# Patient Record
Sex: MALE | NOT HISPANIC OR LATINO | URBAN - METROPOLITAN AREA
[De-identification: names, ages, dates, MRNs, and addresses within clinical notes are randomized per-mention and may not be internally consistent; named-entity substitution may affect disease eponyms.]

---

## 2023-06-23 LAB
ANION GAP IN SER/PLAS: 10 MMOL/L (ref 10–20)
CALCIUM (MG/DL) IN SER/PLAS: 8.3 MG/DL (ref 8.6–10.3)
CARBON DIOXIDE, TOTAL (MMOL/L) IN SER/PLAS: 30 MMOL/L (ref 21–32)
CHLORIDE (MMOL/L) IN SER/PLAS: 104 MMOL/L (ref 98–107)
CHOLESTEROL (MG/DL) IN SER/PLAS: 101 MG/DL (ref 0–199)
CHOLESTEROL IN HDL (MG/DL) IN SER/PLAS: 36.9 MG/DL
CHOLESTEROL/HDL RATIO: 2.7
CREATININE (MG/DL) IN SER/PLAS: 1.14 MG/DL (ref 0.5–1.3)
GFR MALE: 68 ML/MIN/1.73M2
GLUCOSE (MG/DL) IN SER/PLAS: 76 MG/DL (ref 74–99)
LDL: 54 MG/DL (ref 0–99)
POTASSIUM (MMOL/L) IN SER/PLAS: 5 MMOL/L (ref 3.5–5.3)
SODIUM (MMOL/L) IN SER/PLAS: 139 MMOL/L (ref 136–145)
THYROTROPIN (MIU/L) IN SER/PLAS BY DETECTION LIMIT <= 0.05 MIU/L: 1.18 MIU/L (ref 0.44–3.98)
TRIGLYCERIDE (MG/DL) IN SER/PLAS: 53 MG/DL (ref 0–149)
UREA NITROGEN (MG/DL) IN SER/PLAS: 17 MG/DL (ref 6–23)
VLDL: 11 MG/DL (ref 0–40)

## 2023-06-26 LAB
ESTIMATED AVERAGE GLUCOSE FOR HBA1C: 243 MG/DL
HEMOGLOBIN A1C/HEMOGLOBIN TOTAL IN BLOOD: 10.1 %

## 2023-08-31 PROBLEM — E11.9 TYPE 2 DIABETES MELLITUS (MULTI): Status: ACTIVE | Noted: 2023-08-31

## 2023-08-31 PROBLEM — Z79.4 LONG TERM (CURRENT) USE OF INSULIN (MULTI): Status: ACTIVE | Noted: 2023-08-31

## 2023-08-31 RX ORDER — INSULIN GLARGINE 100 [IU]/ML
10 INJECTION, SOLUTION SUBCUTANEOUS NIGHTLY
COMMUNITY
Start: 2022-12-19 | End: 2023-10-09 | Stop reason: DRUGHIGH

## 2023-08-31 RX ORDER — ACETAMINOPHEN 325 MG/1
2 TABLET ORAL EVERY 4 HOURS PRN
COMMUNITY
Start: 2022-12-19

## 2023-08-31 RX ORDER — BECAPLERMIN 100 UG/G
GEL TOPICAL
COMMUNITY
Start: 2009-04-17

## 2023-08-31 RX ORDER — METFORMIN HYDROCHLORIDE 1000 MG/1
1 TABLET ORAL DAILY
COMMUNITY

## 2023-08-31 RX ORDER — PANTOPRAZOLE SODIUM 40 MG/1
1 TABLET, DELAYED RELEASE ORAL DAILY
COMMUNITY
Start: 2022-12-19

## 2023-10-04 ENCOUNTER — APPOINTMENT (OUTPATIENT)
Dept: WOUND CARE | Facility: CLINIC | Age: 73
End: 2023-10-04
Payer: MEDICARE

## 2023-10-04 ENCOUNTER — OFFICE VISIT (OUTPATIENT)
Dept: WOUND CARE | Facility: CLINIC | Age: 73
End: 2023-10-04
Payer: MEDICARE

## 2023-10-04 PROCEDURE — 3046F HEMOGLOBIN A1C LEVEL >9.0%: CPT | Performed by: CLINICAL NURSE SPECIALIST

## 2023-10-04 PROCEDURE — 11043 DBRDMT MUSC&/FSCA 1ST 20/<: CPT | Performed by: CLINICAL NURSE SPECIALIST

## 2023-10-04 PROCEDURE — 3066F NEPHROPATHY DOC TX: CPT | Performed by: CLINICAL NURSE SPECIALIST

## 2023-10-04 PROCEDURE — 1159F MED LIST DOCD IN RCRD: CPT | Performed by: CLINICAL NURSE SPECIALIST

## 2023-10-04 PROCEDURE — 11042 DBRDMT SUBQ TIS 1ST 20SQCM/<: CPT | Performed by: CLINICAL NURSE SPECIALIST

## 2023-10-04 PROCEDURE — 11042 DBRDMT SUBQ TIS 1ST 20SQCM/<: CPT

## 2023-10-04 PROCEDURE — 11043 DBRDMT MUSC&/FSCA 1ST 20/<: CPT
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
## 2023-10-08 NOTE — PATIENT INSTRUCTIONS
Thank you for choosing [facility]  for your health care needs.  If you have any questions, concerns or medical needs, please feel free to contact our office at [phone].    Please ensure you complete your blood work one week before the next scheduled appointment.    To continue Metformin 1000mg twice daily; please be consistent with this   To discontinue Lantus 25 units subcutaneous bedtime  To commence Humalog 75/25 20 units subcutaneous twice daily   Counseled that the goal A1C should be 7% or less  Counseled glycemic control is warranted to prevent microvascular complications  For follow up in 3 months

## 2023-10-08 NOTE — ASSESSMENT & PLAN NOTE
To continue Metformin 1000mg twice daily; please be consistent with this   Counseled that he has no renal contraindications to the use of Metformin   To discontinue Lantus 25 units subcutaneous bedtime  To commence Humalog 75/25 20 units subcutaneous twice daily   Counseled that the goal A1C should be 7% or less  Counseled glycemic control is warranted to prevent microvascular complications units subcutaneous bedtime

## 2023-10-09 ENCOUNTER — OFFICE VISIT (OUTPATIENT)
Dept: ENDOCRINOLOGY | Facility: CLINIC | Age: 73
End: 2023-10-09
Payer: MEDICARE

## 2023-10-09 VITALS
HEART RATE: 88 BPM | DIASTOLIC BLOOD PRESSURE: 60 MMHG | SYSTOLIC BLOOD PRESSURE: 118 MMHG | HEIGHT: 72 IN | BODY MASS INDEX: 20.59 KG/M2 | WEIGHT: 152 LBS

## 2023-10-09 DIAGNOSIS — E11.40 TYPE 2 DIABETES MELLITUS WITH DIABETIC NEUROPATHY, WITH LONG-TERM CURRENT USE OF INSULIN (MULTI): Primary | ICD-10-CM

## 2023-10-09 DIAGNOSIS — Z79.4 TYPE 2 DIABETES MELLITUS WITH DIABETIC NEUROPATHY, WITH LONG-TERM CURRENT USE OF INSULIN (MULTI): Primary | ICD-10-CM

## 2023-10-09 PROBLEM — Z72.0 TOBACCO ABUSE: Status: ACTIVE | Noted: 2023-10-09

## 2023-10-09 LAB
POC FINGERSTICK BLOOD GLUCOSE: 173 MG/DL (ref 70–100)
POC HEMOGLOBIN A1C: 14 % (ref 4.2–6.5)

## 2023-10-09 PROCEDURE — 1159F MED LIST DOCD IN RCRD: CPT | Performed by: INTERNAL MEDICINE

## 2023-10-09 PROCEDURE — 3066F NEPHROPATHY DOC TX: CPT | Performed by: INTERNAL MEDICINE

## 2023-10-09 PROCEDURE — 3046F HEMOGLOBIN A1C LEVEL >9.0%: CPT | Performed by: INTERNAL MEDICINE

## 2023-10-09 PROCEDURE — 99214 OFFICE O/P EST MOD 30 MIN: CPT | Performed by: INTERNAL MEDICINE

## 2023-10-09 PROCEDURE — 3074F SYST BP LT 130 MM HG: CPT | Performed by: INTERNAL MEDICINE

## 2023-10-09 PROCEDURE — 83036 HEMOGLOBIN GLYCOSYLATED A1C: CPT | Performed by: INTERNAL MEDICINE

## 2023-10-09 PROCEDURE — 3078F DIAST BP <80 MM HG: CPT | Performed by: INTERNAL MEDICINE

## 2023-10-09 PROCEDURE — 82962 GLUCOSE BLOOD TEST: CPT | Performed by: INTERNAL MEDICINE

## 2023-10-09 RX ORDER — INSULIN LISPRO 100 [IU]/ML
20 INJECTION, SUSPENSION SUBCUTANEOUS
Qty: 36 ML | Refills: 3 | Status: SHIPPED | OUTPATIENT
Start: 2023-10-09 | End: 2023-10-19 | Stop reason: CLARIF

## 2023-10-09 ASSESSMENT — ENCOUNTER SYMPTOMS
COUGH: 1
SHORTNESS OF BREATH: 0

## 2023-10-11 ENCOUNTER — OFFICE VISIT (OUTPATIENT)
Dept: WOUND CARE | Facility: CLINIC | Age: 73
End: 2023-10-11
Payer: MEDICARE

## 2023-10-11 ENCOUNTER — APPOINTMENT (OUTPATIENT)
Dept: WOUND CARE | Facility: CLINIC | Age: 73
End: 2023-10-11

## 2023-10-11 PROCEDURE — 11042 DBRDMT SUBQ TIS 1ST 20SQCM/<: CPT

## 2023-10-18 ENCOUNTER — OFFICE VISIT (OUTPATIENT)
Dept: WOUND CARE | Facility: CLINIC | Age: 73
End: 2023-10-18
Payer: MEDICARE

## 2023-10-18 ENCOUNTER — APPOINTMENT (OUTPATIENT)
Dept: WOUND CARE | Facility: CLINIC | Age: 73
End: 2023-10-18

## 2023-10-18 PROCEDURE — 11043 DBRDMT MUSC&/FSCA 1ST 20/<: CPT

## 2023-10-19 DIAGNOSIS — E11.9 TYPE 2 DIABETES MELLITUS WITHOUT COMPLICATION, WITH LONG-TERM CURRENT USE OF INSULIN (MULTI): Primary | ICD-10-CM

## 2023-10-19 DIAGNOSIS — Z79.4 TYPE 2 DIABETES MELLITUS WITHOUT COMPLICATION, WITH LONG-TERM CURRENT USE OF INSULIN (MULTI): Primary | ICD-10-CM

## 2023-10-19 RX ORDER — INSULIN ASPART 100 [IU]/ML
20 INJECTION, SUSPENSION SUBCUTANEOUS
Qty: 15 ML | Refills: 4 | Status: SHIPPED | OUTPATIENT
Start: 2023-10-19 | End: 2024-04-24

## 2023-10-25 ENCOUNTER — APPOINTMENT (OUTPATIENT)
Dept: WOUND CARE | Facility: CLINIC | Age: 73
End: 2023-10-25
Payer: MEDICARE

## 2023-11-01 ENCOUNTER — OFFICE VISIT (OUTPATIENT)
Dept: WOUND CARE | Facility: CLINIC | Age: 73
End: 2023-11-01
Payer: MEDICARE

## 2023-11-01 PROCEDURE — 11043 DBRDMT MUSC&/FSCA 1ST 20/<: CPT

## 2023-11-15 ENCOUNTER — LAB REQUISITION (OUTPATIENT)
Dept: LAB | Facility: HOSPITAL | Age: 73
End: 2023-11-15
Payer: MEDICARE

## 2023-11-15 ENCOUNTER — OFFICE VISIT (OUTPATIENT)
Dept: WOUND CARE | Facility: CLINIC | Age: 73
End: 2023-11-15
Payer: MEDICARE

## 2023-11-15 DIAGNOSIS — L97.823 NON-PRESSURE CHRONIC ULCER OF OTHER PART OF LEFT LOWER LEG WITH NECROSIS OF MUSCLE (MULTI): ICD-10-CM

## 2023-11-15 PROCEDURE — 87186 SC STD MICRODIL/AGAR DIL: CPT | Mod: OUT,PORLAB | Performed by: PODIATRIST

## 2023-11-15 PROCEDURE — 11043 DBRDMT MUSC&/FSCA 1ST 20/<: CPT

## 2023-11-19 LAB
BACTERIA SPEC CULT: ABNORMAL
BACTERIA SPEC CULT: ABNORMAL
GRAM STN SPEC: ABNORMAL
GRAM STN SPEC: ABNORMAL

## 2023-11-22 ENCOUNTER — OFFICE VISIT (OUTPATIENT)
Dept: WOUND CARE | Facility: CLINIC | Age: 73
End: 2023-11-22
Payer: MEDICARE

## 2023-11-22 PROCEDURE — 11042 DBRDMT SUBQ TIS 1ST 20SQCM/<: CPT | Performed by: CLINICAL NURSE SPECIALIST

## 2023-11-22 PROCEDURE — 11042 DBRDMT SUBQ TIS 1ST 20SQCM/<: CPT

## 2023-11-22 PROCEDURE — 99213 OFFICE O/P EST LOW 20 MIN: CPT | Performed by: CLINICAL NURSE SPECIALIST

## 2023-11-29 ENCOUNTER — OFFICE VISIT (OUTPATIENT)
Dept: WOUND CARE | Facility: CLINIC | Age: 73
End: 2023-11-29
Payer: MEDICARE

## 2023-11-29 PROCEDURE — 11042 DBRDMT SUBQ TIS 1ST 20SQCM/<: CPT

## 2023-12-06 ENCOUNTER — OFFICE VISIT (OUTPATIENT)
Dept: WOUND CARE | Facility: CLINIC | Age: 73
End: 2023-12-06
Payer: MEDICARE

## 2023-12-06 PROCEDURE — 11043 DBRDMT MUSC&/FSCA 1ST 20/<: CPT

## 2023-12-13 ENCOUNTER — OFFICE VISIT (OUTPATIENT)
Dept: WOUND CARE | Facility: CLINIC | Age: 73
End: 2023-12-13
Payer: MEDICARE

## 2023-12-13 PROCEDURE — 11043 DBRDMT MUSC&/FSCA 1ST 20/<: CPT

## 2023-12-20 ENCOUNTER — LAB REQUISITION (OUTPATIENT)
Dept: LAB | Facility: HOSPITAL | Age: 73
End: 2023-12-20
Payer: MEDICARE

## 2023-12-20 ENCOUNTER — OFFICE VISIT (OUTPATIENT)
Dept: WOUND CARE | Facility: CLINIC | Age: 73
End: 2023-12-20
Payer: MEDICARE

## 2023-12-20 DIAGNOSIS — L97.812 NON-PRESSURE CHRONIC ULCER OF OTHER PART OF RIGHT LOWER LEG WITH FAT LAYER EXPOSED (MULTI): ICD-10-CM

## 2023-12-20 PROCEDURE — 87205 SMEAR GRAM STAIN: CPT | Mod: OUT,PORLAB | Performed by: PODIATRIST

## 2023-12-20 PROCEDURE — 87070 CULTURE OTHR SPECIMN AEROBIC: CPT | Mod: OUT,PORLAB | Performed by: PODIATRIST

## 2023-12-20 PROCEDURE — 11043 DBRDMT MUSC&/FSCA 1ST 20/<: CPT

## 2023-12-23 LAB
B-LACTAMASE ORGANISM ISLT: NEGATIVE
BACTERIA SPEC CULT: NORMAL
BACTERIA SPEC CULT: NORMAL
GRAM STN SPEC: NORMAL
GRAM STN SPEC: NORMAL

## 2023-12-27 ENCOUNTER — APPOINTMENT (OUTPATIENT)
Dept: WOUND CARE | Facility: CLINIC | Age: 73
End: 2023-12-27
Payer: MEDICARE

## 2024-01-03 ENCOUNTER — OFFICE VISIT (OUTPATIENT)
Dept: WOUND CARE | Facility: CLINIC | Age: 74
End: 2024-01-03
Payer: MEDICARE

## 2024-01-03 PROCEDURE — 11043 DBRDMT MUSC&/FSCA 1ST 20/<: CPT

## 2024-01-10 ENCOUNTER — OFFICE VISIT (OUTPATIENT)
Dept: WOUND CARE | Facility: CLINIC | Age: 74
End: 2024-01-10
Payer: MEDICARE

## 2024-01-10 ENCOUNTER — LAB REQUISITION (OUTPATIENT)
Dept: LAB | Facility: HOSPITAL | Age: 74
End: 2024-01-10
Payer: MEDICARE

## 2024-01-10 DIAGNOSIS — L97.324: ICD-10-CM

## 2024-01-10 DIAGNOSIS — E11.622 TYPE 2 DIABETES MELLITUS WITH OTHER SKIN ULCER (CODE) (MULTI): ICD-10-CM

## 2024-01-10 PROCEDURE — 87070 CULTURE OTHR SPECIMN AEROBIC: CPT | Mod: OUT,PORLAB | Performed by: PODIATRIST

## 2024-01-10 PROCEDURE — 11043 DBRDMT MUSC&/FSCA 1ST 20/<: CPT

## 2024-01-13 LAB
BACTERIA SPEC CULT: ABNORMAL
GRAM STN SPEC: ABNORMAL
GRAM STN SPEC: ABNORMAL

## 2024-01-17 ENCOUNTER — OFFICE VISIT (OUTPATIENT)
Dept: WOUND CARE | Facility: CLINIC | Age: 74
End: 2024-01-17
Payer: MEDICARE

## 2024-01-17 PROCEDURE — 11042 DBRDMT SUBQ TIS 1ST 20SQCM/<: CPT

## 2024-01-24 ENCOUNTER — OFFICE VISIT (OUTPATIENT)
Dept: WOUND CARE | Facility: CLINIC | Age: 74
End: 2024-01-24
Payer: MEDICARE

## 2024-01-24 PROCEDURE — 99213 OFFICE O/P EST LOW 20 MIN: CPT | Performed by: CLINICAL NURSE SPECIALIST

## 2024-01-24 PROCEDURE — 11043 DBRDMT MUSC&/FSCA 1ST 20/<: CPT | Performed by: CLINICAL NURSE SPECIALIST

## 2024-01-24 PROCEDURE — 11043 DBRDMT MUSC&/FSCA 1ST 20/<: CPT

## 2024-01-31 ENCOUNTER — OFFICE VISIT (OUTPATIENT)
Dept: WOUND CARE | Facility: CLINIC | Age: 74
End: 2024-01-31
Payer: MEDICARE

## 2024-01-31 PROCEDURE — 11043 DBRDMT MUSC&/FSCA 1ST 20/<: CPT

## 2024-01-31 PROCEDURE — 11042 DBRDMT SUBQ TIS 1ST 20SQCM/<: CPT

## 2024-02-07 ENCOUNTER — OFFICE VISIT (OUTPATIENT)
Dept: WOUND CARE | Facility: CLINIC | Age: 74
End: 2024-02-07
Payer: MEDICARE

## 2024-02-07 PROCEDURE — 11043 DBRDMT MUSC&/FSCA 1ST 20/<: CPT

## 2024-02-14 ENCOUNTER — OFFICE VISIT (OUTPATIENT)
Dept: WOUND CARE | Facility: CLINIC | Age: 74
End: 2024-02-14
Payer: MEDICARE

## 2024-02-14 PROCEDURE — 11043 DBRDMT MUSC&/FSCA 1ST 20/<: CPT

## 2024-02-21 ENCOUNTER — OFFICE VISIT (OUTPATIENT)
Dept: WOUND CARE | Facility: CLINIC | Age: 74
End: 2024-02-21
Payer: MEDICARE

## 2024-02-21 PROCEDURE — 11042 DBRDMT SUBQ TIS 1ST 20SQCM/<: CPT

## 2024-02-28 ENCOUNTER — OFFICE VISIT (OUTPATIENT)
Dept: WOUND CARE | Facility: CLINIC | Age: 74
End: 2024-02-28
Payer: MEDICARE

## 2024-02-28 PROCEDURE — 11042 DBRDMT SUBQ TIS 1ST 20SQCM/<: CPT

## 2024-03-06 ENCOUNTER — OFFICE VISIT (OUTPATIENT)
Dept: WOUND CARE | Facility: CLINIC | Age: 74
End: 2024-03-06
Payer: MEDICARE

## 2024-03-06 PROCEDURE — 11043 DBRDMT MUSC&/FSCA 1ST 20/<: CPT

## 2024-03-13 ENCOUNTER — OFFICE VISIT (OUTPATIENT)
Dept: WOUND CARE | Facility: CLINIC | Age: 74
End: 2024-03-13
Payer: MEDICARE

## 2024-03-13 PROCEDURE — 11043 DBRDMT MUSC&/FSCA 1ST 20/<: CPT | Mod: ZK

## 2024-03-13 PROCEDURE — 11043 DBRDMT MUSC&/FSCA 1ST 20/<: CPT | Performed by: CLINICAL NURSE SPECIALIST

## 2024-03-20 ENCOUNTER — OFFICE VISIT (OUTPATIENT)
Dept: WOUND CARE | Facility: CLINIC | Age: 74
End: 2024-03-20
Payer: MEDICARE

## 2024-03-20 PROCEDURE — 11042 DBRDMT SUBQ TIS 1ST 20SQCM/<: CPT

## 2024-03-21 DIAGNOSIS — M86.9 OSTEOMYELITIS, UNSPECIFIED (MULTI): Primary | ICD-10-CM

## 2024-03-27 ENCOUNTER — OFFICE VISIT (OUTPATIENT)
Dept: WOUND CARE | Facility: CLINIC | Age: 74
End: 2024-03-27
Payer: MEDICARE

## 2024-03-27 PROCEDURE — 11042 DBRDMT SUBQ TIS 1ST 20SQCM/<: CPT

## 2024-04-03 ENCOUNTER — OFFICE VISIT (OUTPATIENT)
Dept: WOUND CARE | Facility: CLINIC | Age: 74
End: 2024-04-03
Payer: MEDICARE

## 2024-04-03 ENCOUNTER — LAB REQUISITION (OUTPATIENT)
Dept: LAB | Facility: HOSPITAL | Age: 74
End: 2024-04-03
Payer: MEDICARE

## 2024-04-03 DIAGNOSIS — L97.812 NON-PRESSURE CHRONIC ULCER OF OTHER PART OF RIGHT LOWER LEG WITH FAT LAYER EXPOSED (MULTI): ICD-10-CM

## 2024-04-03 PROCEDURE — 11043 DBRDMT MUSC&/FSCA 1ST 20/<: CPT

## 2024-04-03 PROCEDURE — 87070 CULTURE OTHR SPECIMN AEROBIC: CPT | Mod: OUT,PORLAB | Performed by: PODIATRIST

## 2024-04-04 ENCOUNTER — APPOINTMENT (OUTPATIENT)
Dept: WOUND CARE | Facility: CLINIC | Age: 74
End: 2024-04-04
Payer: MEDICARE

## 2024-04-10 ENCOUNTER — OFFICE VISIT (OUTPATIENT)
Dept: WOUND CARE | Facility: CLINIC | Age: 74
End: 2024-04-10
Payer: MEDICARE

## 2024-04-10 PROCEDURE — 11042 DBRDMT SUBQ TIS 1ST 20SQCM/<: CPT

## 2024-04-17 ENCOUNTER — OFFICE VISIT (OUTPATIENT)
Dept: WOUND CARE | Facility: CLINIC | Age: 74
End: 2024-04-17
Payer: MEDICARE

## 2024-04-17 PROCEDURE — 11042 DBRDMT SUBQ TIS 1ST 20SQCM/<: CPT

## 2024-04-24 ENCOUNTER — OFFICE VISIT (OUTPATIENT)
Dept: WOUND CARE | Facility: CLINIC | Age: 74
End: 2024-04-24
Payer: MEDICARE

## 2024-04-24 PROCEDURE — 11043 DBRDMT MUSC&/FSCA 1ST 20/<: CPT

## 2024-05-01 ENCOUNTER — LAB (OUTPATIENT)
Dept: LAB | Facility: LAB | Age: 74
End: 2024-05-01
Payer: MEDICARE

## 2024-05-01 ENCOUNTER — OFFICE VISIT (OUTPATIENT)
Dept: WOUND CARE | Facility: CLINIC | Age: 74
End: 2024-05-01
Payer: MEDICARE

## 2024-05-01 DIAGNOSIS — E11.621 TYPE 2 DIABETES MELLITUS WITH FOOT ULCER (CODE) (MULTI): Primary | ICD-10-CM

## 2024-05-01 PROCEDURE — 83036 HEMOGLOBIN GLYCOSYLATED A1C: CPT

## 2024-05-01 PROCEDURE — 11043 DBRDMT MUSC&/FSCA 1ST 20/<: CPT

## 2024-05-01 PROCEDURE — 36415 COLL VENOUS BLD VENIPUNCTURE: CPT

## 2024-05-02 LAB
EST. AVERAGE GLUCOSE BLD GHB EST-MCNC: 237 MG/DL
HBA1C MFR BLD: 9.9 %

## 2024-05-08 ENCOUNTER — OFFICE VISIT (OUTPATIENT)
Dept: WOUND CARE | Facility: CLINIC | Age: 74
End: 2024-05-08
Payer: MEDICARE

## 2024-05-08 PROCEDURE — 11042 DBRDMT SUBQ TIS 1ST 20SQCM/<: CPT

## 2024-05-15 ENCOUNTER — OFFICE VISIT (OUTPATIENT)
Dept: WOUND CARE | Facility: CLINIC | Age: 74
End: 2024-05-15
Payer: MEDICARE

## 2024-05-15 PROCEDURE — 11042 DBRDMT SUBQ TIS 1ST 20SQCM/<: CPT

## 2024-05-22 ENCOUNTER — OFFICE VISIT (OUTPATIENT)
Dept: WOUND CARE | Facility: CLINIC | Age: 74
End: 2024-05-22
Payer: MEDICARE

## 2024-05-22 PROCEDURE — 15275 SKIN SUB GRAFT FACE/NK/HF/G: CPT

## 2024-05-29 ENCOUNTER — OFFICE VISIT (OUTPATIENT)
Dept: WOUND CARE | Facility: CLINIC | Age: 74
End: 2024-05-29
Payer: MEDICARE

## 2024-05-29 PROCEDURE — 15271 SKIN SUB GRAFT TRNK/ARM/LEG: CPT

## 2024-06-05 ENCOUNTER — OFFICE VISIT (OUTPATIENT)
Dept: WOUND CARE | Facility: CLINIC | Age: 74
End: 2024-06-05
Payer: MEDICARE

## 2024-06-05 PROCEDURE — 15271 SKIN SUB GRAFT TRNK/ARM/LEG: CPT

## 2024-06-12 ENCOUNTER — OFFICE VISIT (OUTPATIENT)
Dept: WOUND CARE | Facility: CLINIC | Age: 74
End: 2024-06-12
Payer: MEDICARE

## 2024-06-12 PROCEDURE — 15271 SKIN SUB GRAFT TRNK/ARM/LEG: CPT

## 2024-06-19 ENCOUNTER — OFFICE VISIT (OUTPATIENT)
Dept: WOUND CARE | Facility: CLINIC | Age: 74
End: 2024-06-19
Payer: MEDICARE

## 2024-06-19 PROCEDURE — 15271 SKIN SUB GRAFT TRNK/ARM/LEG: CPT

## 2024-06-26 ENCOUNTER — OFFICE VISIT (OUTPATIENT)
Dept: WOUND CARE | Facility: CLINIC | Age: 74
End: 2024-06-26
Payer: MEDICARE

## 2024-06-26 PROCEDURE — 11042 DBRDMT SUBQ TIS 1ST 20SQCM/<: CPT | Mod: 59

## 2024-06-26 PROCEDURE — 15271 SKIN SUB GRAFT TRNK/ARM/LEG: CPT

## 2024-07-03 ENCOUNTER — OFFICE VISIT (OUTPATIENT)
Dept: WOUND CARE | Facility: CLINIC | Age: 74
End: 2024-07-03
Payer: MEDICARE

## 2024-07-03 PROCEDURE — 11043 DBRDMT MUSC&/FSCA 1ST 20/<: CPT

## 2024-07-10 ENCOUNTER — APPOINTMENT (OUTPATIENT)
Dept: WOUND CARE | Facility: CLINIC | Age: 74
End: 2024-07-10
Payer: MEDICARE

## 2024-07-11 ENCOUNTER — CLINICAL SUPPORT (OUTPATIENT)
Dept: WOUND CARE | Facility: CLINIC | Age: 74
End: 2024-07-11
Payer: MEDICARE

## 2024-07-11 PROCEDURE — 99214 OFFICE O/P EST MOD 30 MIN: CPT

## 2024-07-17 ENCOUNTER — OFFICE VISIT (OUTPATIENT)
Dept: WOUND CARE | Facility: CLINIC | Age: 74
End: 2024-07-17
Payer: MEDICARE

## 2024-07-17 PROCEDURE — 15271 SKIN SUB GRAFT TRNK/ARM/LEG: CPT

## 2024-07-22 DIAGNOSIS — Z79.4 TYPE 2 DIABETES MELLITUS WITH DIABETIC NEUROPATHY, WITH LONG-TERM CURRENT USE OF INSULIN (MULTI): Primary | ICD-10-CM

## 2024-07-22 DIAGNOSIS — E11.40 TYPE 2 DIABETES MELLITUS WITH DIABETIC NEUROPATHY, WITH LONG-TERM CURRENT USE OF INSULIN (MULTI): Primary | ICD-10-CM

## 2024-07-23 RX ORDER — INSULIN GLARGINE 100 [IU]/ML
INJECTION, SOLUTION SUBCUTANEOUS
Qty: 10 EACH | Refills: 5 | Status: SHIPPED | OUTPATIENT
Start: 2024-07-23

## 2024-07-24 ENCOUNTER — OFFICE VISIT (OUTPATIENT)
Dept: WOUND CARE | Facility: CLINIC | Age: 74
End: 2024-07-24
Payer: MEDICARE

## 2024-07-24 PROCEDURE — 15271 SKIN SUB GRAFT TRNK/ARM/LEG: CPT

## 2024-07-31 ENCOUNTER — OFFICE VISIT (OUTPATIENT)
Dept: WOUND CARE | Facility: CLINIC | Age: 74
End: 2024-07-31
Payer: MEDICARE

## 2024-07-31 PROCEDURE — 11043 DBRDMT MUSC&/FSCA 1ST 20/<: CPT

## 2024-08-07 ENCOUNTER — OFFICE VISIT (OUTPATIENT)
Dept: WOUND CARE | Facility: CLINIC | Age: 74
End: 2024-08-07
Payer: MEDICARE

## 2024-08-07 PROCEDURE — 11042 DBRDMT SUBQ TIS 1ST 20SQCM/<: CPT

## 2024-08-14 ENCOUNTER — OFFICE VISIT (OUTPATIENT)
Dept: WOUND CARE | Facility: CLINIC | Age: 74
End: 2024-08-14
Payer: MEDICARE

## 2024-08-14 PROCEDURE — 11043 DBRDMT MUSC&/FSCA 1ST 20/<: CPT

## 2024-08-21 ENCOUNTER — OFFICE VISIT (OUTPATIENT)
Dept: WOUND CARE | Facility: CLINIC | Age: 74
End: 2024-08-21
Payer: MEDICARE

## 2024-08-21 PROCEDURE — 11042 DBRDMT SUBQ TIS 1ST 20SQCM/<: CPT

## 2024-08-28 ENCOUNTER — OFFICE VISIT (OUTPATIENT)
Dept: WOUND CARE | Facility: CLINIC | Age: 74
End: 2024-08-28
Payer: MEDICARE

## 2024-08-28 PROCEDURE — 11042 DBRDMT SUBQ TIS 1ST 20SQCM/<: CPT

## 2024-09-04 ENCOUNTER — OFFICE VISIT (OUTPATIENT)
Dept: WOUND CARE | Facility: CLINIC | Age: 74
End: 2024-09-04
Payer: MEDICARE

## 2024-09-04 PROCEDURE — 11043 DBRDMT MUSC&/FSCA 1ST 20/<: CPT

## 2024-09-11 ENCOUNTER — OFFICE VISIT (OUTPATIENT)
Dept: WOUND CARE | Facility: CLINIC | Age: 74
End: 2024-09-11
Payer: MEDICARE

## 2024-09-11 PROCEDURE — 11042 DBRDMT SUBQ TIS 1ST 20SQCM/<: CPT

## 2024-09-18 ENCOUNTER — OFFICE VISIT (OUTPATIENT)
Dept: WOUND CARE | Facility: CLINIC | Age: 74
End: 2024-09-18
Payer: MEDICARE

## 2024-09-18 PROCEDURE — 11043 DBRDMT MUSC&/FSCA 1ST 20/<: CPT

## 2024-09-25 ENCOUNTER — OFFICE VISIT (OUTPATIENT)
Dept: WOUND CARE | Facility: CLINIC | Age: 74
End: 2024-09-25
Payer: MEDICARE

## 2024-09-25 PROCEDURE — 11045 DBRDMT SUBQ TISS EACH ADDL: CPT

## 2024-09-25 PROCEDURE — 11042 DBRDMT SUBQ TIS 1ST 20SQCM/<: CPT

## 2024-10-02 ENCOUNTER — OFFICE VISIT (OUTPATIENT)
Dept: WOUND CARE | Facility: CLINIC | Age: 74
End: 2024-10-02
Payer: MEDICARE

## 2024-10-02 PROCEDURE — 11043 DBRDMT MUSC&/FSCA 1ST 20/<: CPT

## 2024-10-09 ENCOUNTER — OFFICE VISIT (OUTPATIENT)
Dept: WOUND CARE | Facility: CLINIC | Age: 74
End: 2024-10-09
Payer: MEDICARE

## 2024-10-09 PROCEDURE — 11043 DBRDMT MUSC&/FSCA 1ST 20/<: CPT

## 2024-10-16 ENCOUNTER — OFFICE VISIT (OUTPATIENT)
Dept: WOUND CARE | Facility: CLINIC | Age: 74
End: 2024-10-16
Payer: MEDICARE

## 2024-10-16 PROCEDURE — 11043 DBRDMT MUSC&/FSCA 1ST 20/<: CPT

## 2024-10-23 ENCOUNTER — OFFICE VISIT (OUTPATIENT)
Dept: WOUND CARE | Facility: CLINIC | Age: 74
End: 2024-10-23
Payer: MEDICARE

## 2024-10-23 PROCEDURE — 11043 DBRDMT MUSC&/FSCA 1ST 20/<: CPT

## 2024-10-30 ENCOUNTER — OFFICE VISIT (OUTPATIENT)
Dept: WOUND CARE | Facility: CLINIC | Age: 74
End: 2024-10-30
Payer: MEDICARE

## 2024-10-30 PROCEDURE — 11042 DBRDMT SUBQ TIS 1ST 20SQCM/<: CPT

## 2024-11-06 ENCOUNTER — OFFICE VISIT (OUTPATIENT)
Dept: WOUND CARE | Facility: CLINIC | Age: 74
End: 2024-11-06
Payer: MEDICARE

## 2024-11-06 PROCEDURE — 11042 DBRDMT SUBQ TIS 1ST 20SQCM/<: CPT

## 2024-11-13 ENCOUNTER — LAB REQUISITION (OUTPATIENT)
Dept: LAB | Facility: HOSPITAL | Age: 74
End: 2024-11-13
Payer: MEDICARE

## 2024-11-13 ENCOUNTER — OFFICE VISIT (OUTPATIENT)
Dept: WOUND CARE | Facility: CLINIC | Age: 74
End: 2024-11-13
Payer: MEDICARE

## 2024-11-13 DIAGNOSIS — L97.813 NON-PRESSURE CHRONIC ULCER OF OTHER PART OF RIGHT LOWER LEG WITH NECROSIS OF MUSCLE: ICD-10-CM

## 2024-11-13 PROCEDURE — 11043 DBRDMT MUSC&/FSCA 1ST 20/<: CPT

## 2024-11-13 PROCEDURE — 87077 CULTURE AEROBIC IDENTIFY: CPT | Mod: OUT,PORLAB | Performed by: PODIATRIST

## 2024-11-20 ENCOUNTER — OFFICE VISIT (OUTPATIENT)
Dept: WOUND CARE | Facility: CLINIC | Age: 74
End: 2024-11-20
Payer: MEDICARE

## 2024-11-20 PROCEDURE — 11043 DBRDMT MUSC&/FSCA 1ST 20/<: CPT

## 2024-11-27 ENCOUNTER — OFFICE VISIT (OUTPATIENT)
Dept: WOUND CARE | Facility: CLINIC | Age: 74
End: 2024-11-27
Payer: MEDICARE

## 2024-11-27 PROCEDURE — 11042 DBRDMT SUBQ TIS 1ST 20SQCM/<: CPT

## 2024-12-04 ENCOUNTER — OFFICE VISIT (OUTPATIENT)
Dept: WOUND CARE | Facility: CLINIC | Age: 74
End: 2024-12-04
Payer: MEDICARE

## 2024-12-04 PROCEDURE — 11043 DBRDMT MUSC&/FSCA 1ST 20/<: CPT

## 2024-12-11 ENCOUNTER — OFFICE VISIT (OUTPATIENT)
Dept: WOUND CARE | Facility: CLINIC | Age: 74
End: 2024-12-11
Payer: MEDICARE

## 2024-12-11 ENCOUNTER — LAB REQUISITION (OUTPATIENT)
Dept: LAB | Facility: HOSPITAL | Age: 74
End: 2024-12-11
Payer: MEDICARE

## 2024-12-11 DIAGNOSIS — L97.813 NON-PRESSURE CHRONIC ULCER OF OTHER PART OF RIGHT LOWER LEG WITH NECROSIS OF MUSCLE: ICD-10-CM

## 2024-12-11 PROCEDURE — 11043 DBRDMT MUSC&/FSCA 1ST 20/<: CPT

## 2024-12-11 PROCEDURE — 87077 CULTURE AEROBIC IDENTIFY: CPT | Mod: OUT,PORLAB | Performed by: PODIATRIST

## 2024-12-13 LAB
BACTERIA SPEC CULT: ABNORMAL
GRAM STN SPEC: ABNORMAL
GRAM STN SPEC: ABNORMAL

## 2024-12-18 ENCOUNTER — OFFICE VISIT (OUTPATIENT)
Dept: WOUND CARE | Facility: CLINIC | Age: 74
End: 2024-12-18
Payer: MEDICARE

## 2024-12-18 PROCEDURE — 11043 DBRDMT MUSC&/FSCA 1ST 20/<: CPT

## 2024-12-31 ENCOUNTER — OFFICE VISIT (OUTPATIENT)
Dept: WOUND CARE | Facility: CLINIC | Age: 74
End: 2024-12-31
Payer: MEDICARE

## 2024-12-31 PROCEDURE — 11042 DBRDMT SUBQ TIS 1ST 20SQCM/<: CPT

## 2025-01-08 ENCOUNTER — OFFICE VISIT (OUTPATIENT)
Dept: WOUND CARE | Facility: CLINIC | Age: 75
End: 2025-01-08
Payer: MEDICARE

## 2025-01-08 PROCEDURE — 11043 DBRDMT MUSC&/FSCA 1ST 20/<: CPT

## 2025-01-15 ENCOUNTER — OFFICE VISIT (OUTPATIENT)
Dept: WOUND CARE | Facility: CLINIC | Age: 75
End: 2025-01-15
Payer: MEDICARE

## 2025-01-15 PROCEDURE — 11046 DBRDMT MUSC&/FSCA EA ADDL: CPT

## 2025-01-15 PROCEDURE — 11043 DBRDMT MUSC&/FSCA 1ST 20/<: CPT

## 2025-01-22 ENCOUNTER — OFFICE VISIT (OUTPATIENT)
Dept: WOUND CARE | Facility: CLINIC | Age: 75
End: 2025-01-22
Payer: MEDICARE

## 2025-01-22 PROCEDURE — 11042 DBRDMT SUBQ TIS 1ST 20SQCM/<: CPT

## 2025-01-29 ENCOUNTER — OFFICE VISIT (OUTPATIENT)
Dept: WOUND CARE | Facility: CLINIC | Age: 75
End: 2025-01-29
Payer: MEDICARE

## 2025-01-29 PROCEDURE — 11043 DBRDMT MUSC&/FSCA 1ST 20/<: CPT

## 2025-02-05 ENCOUNTER — OFFICE VISIT (OUTPATIENT)
Dept: WOUND CARE | Facility: CLINIC | Age: 75
End: 2025-02-05
Payer: MEDICARE

## 2025-02-05 ENCOUNTER — APPOINTMENT (OUTPATIENT)
Dept: WOUND CARE | Facility: CLINIC | Age: 75
End: 2025-02-05
Payer: MEDICARE

## 2025-02-05 PROCEDURE — 11043 DBRDMT MUSC&/FSCA 1ST 20/<: CPT

## 2025-02-12 ENCOUNTER — OFFICE VISIT (OUTPATIENT)
Dept: WOUND CARE | Facility: CLINIC | Age: 75
End: 2025-02-12
Payer: MEDICARE

## 2025-02-12 PROCEDURE — 11042 DBRDMT SUBQ TIS 1ST 20SQCM/<: CPT

## 2025-02-19 ENCOUNTER — OFFICE VISIT (OUTPATIENT)
Dept: WOUND CARE | Facility: CLINIC | Age: 75
End: 2025-02-19
Payer: MEDICARE

## 2025-02-19 PROCEDURE — 11043 DBRDMT MUSC&/FSCA 1ST 20/<: CPT

## 2025-02-26 ENCOUNTER — OFFICE VISIT (OUTPATIENT)
Dept: WOUND CARE | Facility: CLINIC | Age: 75
End: 2025-02-26
Payer: MEDICARE

## 2025-02-26 PROCEDURE — 11043 DBRDMT MUSC&/FSCA 1ST 20/<: CPT

## 2025-03-05 ENCOUNTER — OFFICE VISIT (OUTPATIENT)
Dept: WOUND CARE | Facility: CLINIC | Age: 75
End: 2025-03-05
Payer: MEDICARE

## 2025-03-05 PROCEDURE — 11043 DBRDMT MUSC&/FSCA 1ST 20/<: CPT

## 2025-03-12 ENCOUNTER — OFFICE VISIT (OUTPATIENT)
Dept: WOUND CARE | Facility: CLINIC | Age: 75
End: 2025-03-12
Payer: MEDICARE

## 2025-03-12 PROCEDURE — 11043 DBRDMT MUSC&/FSCA 1ST 20/<: CPT

## 2025-03-19 ENCOUNTER — OFFICE VISIT (OUTPATIENT)
Dept: WOUND CARE | Facility: CLINIC | Age: 75
End: 2025-03-19
Payer: MEDICARE

## 2025-03-19 PROCEDURE — 11043 DBRDMT MUSC&/FSCA 1ST 20/<: CPT

## 2025-03-26 ENCOUNTER — OFFICE VISIT (OUTPATIENT)
Dept: WOUND CARE | Facility: CLINIC | Age: 75
End: 2025-03-26
Payer: MEDICARE

## 2025-03-26 PROCEDURE — 11043 DBRDMT MUSC&/FSCA 1ST 20/<: CPT

## 2025-04-02 ENCOUNTER — OFFICE VISIT (OUTPATIENT)
Dept: WOUND CARE | Facility: CLINIC | Age: 75
End: 2025-04-02
Payer: MEDICARE

## 2025-04-02 PROCEDURE — 0JBR0ZZ EXCISION OF LEFT FOOT SUBCUTANEOUS TISSUE AND FASCIA, OPEN APPROACH: ICD-10-PCS | Performed by: PODIATRIST

## 2025-04-02 PROCEDURE — 11042 DBRDMT SUBQ TIS 1ST 20SQCM/<: CPT

## 2025-04-05 ENCOUNTER — APPOINTMENT (OUTPATIENT)
Dept: CARDIOLOGY | Facility: HOSPITAL | Age: 75
DRG: 166 | End: 2025-04-05
Payer: MEDICARE

## 2025-04-05 ENCOUNTER — APPOINTMENT (OUTPATIENT)
Dept: RADIOLOGY | Facility: HOSPITAL | Age: 75
DRG: 166 | End: 2025-04-05
Payer: MEDICARE

## 2025-04-05 ENCOUNTER — HOSPITAL ENCOUNTER (INPATIENT)
Facility: HOSPITAL | Age: 75
End: 2025-04-05
Attending: EMERGENCY MEDICINE | Admitting: STUDENT IN AN ORGANIZED HEALTH CARE EDUCATION/TRAINING PROGRAM
Payer: MEDICARE

## 2025-04-05 DIAGNOSIS — J18.9 PNEUMONIA DUE TO ORGANISM: Primary | ICD-10-CM

## 2025-04-05 DIAGNOSIS — A41.9 SEPSIS WITHOUT ACUTE ORGAN DYSFUNCTION, DUE TO UNSPECIFIED ORGANISM (MULTI): ICD-10-CM

## 2025-04-05 PROBLEM — K52.9 COLITIS: Status: ACTIVE | Noted: 2025-04-05

## 2025-04-05 PROBLEM — F17.210 CIGARETTE NICOTINE DEPENDENCE WITHOUT COMPLICATION: Status: ACTIVE | Noted: 2025-04-05

## 2025-04-05 LAB
ALBUMIN SERPL BCP-MCNC: 3.4 G/DL (ref 3.4–5)
ALP SERPL-CCNC: 84 U/L (ref 33–136)
ALT SERPL W P-5'-P-CCNC: 9 U/L (ref 10–52)
ANION GAP SERPL CALC-SCNC: 12 MMOL/L (ref 10–20)
APPEARANCE UR: CLEAR
AST SERPL W P-5'-P-CCNC: 19 U/L (ref 9–39)
BACTERIA #/AREA URNS AUTO: ABNORMAL /HPF
BASOPHILS # BLD AUTO: 0.06 X10*3/UL (ref 0–0.1)
BASOPHILS NFR BLD AUTO: 0.3 %
BILIRUB SERPL-MCNC: 0.5 MG/DL (ref 0–1.2)
BILIRUB UR STRIP.AUTO-MCNC: NEGATIVE MG/DL
BUN SERPL-MCNC: 20 MG/DL (ref 6–23)
CALCIUM SERPL-MCNC: 8.6 MG/DL (ref 8.6–10.3)
CHLORIDE SERPL-SCNC: 104 MMOL/L (ref 98–107)
CO2 SERPL-SCNC: 26 MMOL/L (ref 21–32)
COLOR UR: YELLOW
CREAT SERPL-MCNC: 1.35 MG/DL (ref 0.5–1.3)
EGFRCR SERPLBLD CKD-EPI 2021: 55 ML/MIN/1.73M*2
EOSINOPHIL # BLD AUTO: 0.02 X10*3/UL (ref 0–0.4)
EOSINOPHIL NFR BLD AUTO: 0.1 %
ERYTHROCYTE [DISTWIDTH] IN BLOOD BY AUTOMATED COUNT: 13.9 % (ref 11.5–14.5)
EST. AVERAGE GLUCOSE BLD GHB EST-MCNC: 226 MG/DL
GLUCOSE BLD MANUAL STRIP-MCNC: 173 MG/DL (ref 74–99)
GLUCOSE BLD MANUAL STRIP-MCNC: 86 MG/DL (ref 74–99)
GLUCOSE BLD MANUAL STRIP-MCNC: 91 MG/DL (ref 74–99)
GLUCOSE BLD MANUAL STRIP-MCNC: 97 MG/DL (ref 74–99)
GLUCOSE SERPL-MCNC: 90 MG/DL (ref 74–99)
GLUCOSE UR STRIP.AUTO-MCNC: NORMAL MG/DL
HBA1C MFR BLD: 9.5 %
HCT VFR BLD AUTO: 36.6 % (ref 41–52)
HGB BLD-MCNC: 11.4 G/DL (ref 13.5–17.5)
HOLD SPECIMEN: NORMAL
IMM GRANULOCYTES # BLD AUTO: 0.08 X10*3/UL (ref 0–0.5)
IMM GRANULOCYTES NFR BLD AUTO: 0.4 % (ref 0–0.9)
KETONES UR STRIP.AUTO-MCNC: NEGATIVE MG/DL
LACTATE SERPL-SCNC: 1.6 MMOL/L (ref 0.4–2)
LEUKOCYTE ESTERASE UR QL STRIP.AUTO: ABNORMAL
LYMPHOCYTES # BLD AUTO: 0.36 X10*3/UL (ref 0.8–3)
LYMPHOCYTES NFR BLD AUTO: 2 %
MAGNESIUM SERPL-MCNC: 1.41 MG/DL (ref 1.6–2.4)
MCH RBC QN AUTO: 28.6 PG (ref 26–34)
MCHC RBC AUTO-ENTMCNC: 31.1 G/DL (ref 32–36)
MCV RBC AUTO: 92 FL (ref 80–100)
MONOCYTES # BLD AUTO: 0.29 X10*3/UL (ref 0.05–0.8)
MONOCYTES NFR BLD AUTO: 1.6 %
NEUTROPHILS # BLD AUTO: 17.03 X10*3/UL (ref 1.6–5.5)
NEUTROPHILS NFR BLD AUTO: 95.6 %
NITRITE UR QL STRIP.AUTO: NEGATIVE
NRBC BLD-RTO: 0 /100 WBCS (ref 0–0)
PH UR STRIP.AUTO: 5.5 [PH]
PLATELET # BLD AUTO: 393 X10*3/UL (ref 150–450)
POTASSIUM SERPL-SCNC: 3.4 MMOL/L (ref 3.5–5.3)
PROT SERPL-MCNC: 7.5 G/DL (ref 6.4–8.2)
PROT UR STRIP.AUTO-MCNC: ABNORMAL MG/DL
RBC # BLD AUTO: 3.99 X10*6/UL (ref 4.5–5.9)
RBC # UR STRIP.AUTO: ABNORMAL MG/DL
RBC #/AREA URNS AUTO: ABNORMAL /HPF
SODIUM SERPL-SCNC: 139 MMOL/L (ref 136–145)
SP GR UR STRIP.AUTO: 1.03
SQUAMOUS #/AREA URNS AUTO: ABNORMAL /HPF
UROBILINOGEN UR STRIP.AUTO-MCNC: NORMAL MG/DL
WBC # BLD AUTO: 17.8 X10*3/UL (ref 4.4–11.3)
WBC #/AREA URNS AUTO: >50 /HPF

## 2025-04-05 PROCEDURE — 2500000004 HC RX 250 GENERAL PHARMACY W/ HCPCS (ALT 636 FOR OP/ED): Performed by: EMERGENCY MEDICINE

## 2025-04-05 PROCEDURE — 99223 1ST HOSP IP/OBS HIGH 75: CPT | Performed by: STUDENT IN AN ORGANIZED HEALTH CARE EDUCATION/TRAINING PROGRAM

## 2025-04-05 PROCEDURE — 2500000004 HC RX 250 GENERAL PHARMACY W/ HCPCS (ALT 636 FOR OP/ED): Performed by: STUDENT IN AN ORGANIZED HEALTH CARE EDUCATION/TRAINING PROGRAM

## 2025-04-05 PROCEDURE — 96374 THER/PROPH/DIAG INJ IV PUSH: CPT

## 2025-04-05 PROCEDURE — 71250 CT THORAX DX C-: CPT

## 2025-04-05 PROCEDURE — 82947 ASSAY GLUCOSE BLOOD QUANT: CPT

## 2025-04-05 PROCEDURE — 87899 AGENT NOS ASSAY W/OPTIC: CPT | Mod: PORLAB | Performed by: STUDENT IN AN ORGANIZED HEALTH CARE EDUCATION/TRAINING PROGRAM

## 2025-04-05 PROCEDURE — 36415 COLL VENOUS BLD VENIPUNCTURE: CPT | Performed by: EMERGENCY MEDICINE

## 2025-04-05 PROCEDURE — 81003 URINALYSIS AUTO W/O SCOPE: CPT | Performed by: EMERGENCY MEDICINE

## 2025-04-05 PROCEDURE — 83735 ASSAY OF MAGNESIUM: CPT | Performed by: EMERGENCY MEDICINE

## 2025-04-05 PROCEDURE — 74174 CTA ABD&PLVS W/CONTRAST: CPT

## 2025-04-05 PROCEDURE — 85025 COMPLETE CBC W/AUTO DIFF WBC: CPT | Performed by: EMERGENCY MEDICINE

## 2025-04-05 PROCEDURE — 87086 URINE CULTURE/COLONY COUNT: CPT | Mod: PORLAB | Performed by: EMERGENCY MEDICINE

## 2025-04-05 PROCEDURE — 1200000002 HC GENERAL ROOM WITH TELEMETRY DAILY

## 2025-04-05 PROCEDURE — 2550000001 HC RX 255 CONTRASTS: Performed by: EMERGENCY MEDICINE

## 2025-04-05 PROCEDURE — 71045 X-RAY EXAM CHEST 1 VIEW: CPT

## 2025-04-05 PROCEDURE — 2500000001 HC RX 250 WO HCPCS SELF ADMINISTERED DRUGS (ALT 637 FOR MEDICARE OP): Performed by: STUDENT IN AN ORGANIZED HEALTH CARE EDUCATION/TRAINING PROGRAM

## 2025-04-05 PROCEDURE — 80053 COMPREHEN METABOLIC PANEL: CPT | Performed by: EMERGENCY MEDICINE

## 2025-04-05 PROCEDURE — 87040 BLOOD CULTURE FOR BACTERIA: CPT | Mod: PORLAB | Performed by: EMERGENCY MEDICINE

## 2025-04-05 PROCEDURE — 71250 CT THORAX DX C-: CPT | Performed by: RADIOLOGY

## 2025-04-05 PROCEDURE — 74174 CTA ABD&PLVS W/CONTRAST: CPT | Performed by: SURGERY

## 2025-04-05 PROCEDURE — 83036 HEMOGLOBIN GLYCOSYLATED A1C: CPT | Mod: PORLAB | Performed by: STUDENT IN AN ORGANIZED HEALTH CARE EDUCATION/TRAINING PROGRAM

## 2025-04-05 PROCEDURE — 2500000002 HC RX 250 W HCPCS SELF ADMINISTERED DRUGS (ALT 637 FOR MEDICARE OP, ALT 636 FOR OP/ED): Performed by: STUDENT IN AN ORGANIZED HEALTH CARE EDUCATION/TRAINING PROGRAM

## 2025-04-05 PROCEDURE — 71045 X-RAY EXAM CHEST 1 VIEW: CPT | Performed by: SURGERY

## 2025-04-05 PROCEDURE — 83605 ASSAY OF LACTIC ACID: CPT | Performed by: EMERGENCY MEDICINE

## 2025-04-05 PROCEDURE — 87077 CULTURE AEROBIC IDENTIFY: CPT | Mod: PORLAB | Performed by: EMERGENCY MEDICINE

## 2025-04-05 PROCEDURE — 99291 CRITICAL CARE FIRST HOUR: CPT | Performed by: EMERGENCY MEDICINE

## 2025-04-05 PROCEDURE — 93005 ELECTROCARDIOGRAM TRACING: CPT

## 2025-04-05 PROCEDURE — 87449 NOS EACH ORGANISM AG IA: CPT | Mod: PORLAB | Performed by: STUDENT IN AN ORGANIZED HEALTH CARE EDUCATION/TRAINING PROGRAM

## 2025-04-05 RX ORDER — MAGNESIUM SULFATE HEPTAHYDRATE 40 MG/ML
2 INJECTION, SOLUTION INTRAVENOUS ONCE
Status: COMPLETED | OUTPATIENT
Start: 2025-04-05 | End: 2025-04-05

## 2025-04-05 RX ORDER — DEXTROSE 50 % IN WATER (D50W) INTRAVENOUS SYRINGE
25
Status: ACTIVE | OUTPATIENT
Start: 2025-04-05

## 2025-04-05 RX ORDER — ONDANSETRON HYDROCHLORIDE 2 MG/ML
4 INJECTION, SOLUTION INTRAVENOUS EVERY 8 HOURS PRN
Status: ACTIVE | OUTPATIENT
Start: 2025-04-05

## 2025-04-05 RX ORDER — PANTOPRAZOLE SODIUM 40 MG/1
40 TABLET, DELAYED RELEASE ORAL DAILY
Status: DISPENSED | OUTPATIENT
Start: 2025-04-05

## 2025-04-05 RX ORDER — GUAIFENESIN 600 MG/1
600 TABLET, EXTENDED RELEASE ORAL EVERY 12 HOURS PRN
Status: ACTIVE | OUTPATIENT
Start: 2025-04-05

## 2025-04-05 RX ORDER — INSULIN GLARGINE 100 [IU]/ML
25 INJECTION, SOLUTION SUBCUTANEOUS NIGHTLY
Status: DISPENSED | OUTPATIENT
Start: 2025-04-05

## 2025-04-05 RX ORDER — PANTOPRAZOLE SODIUM 40 MG/10ML
40 INJECTION, POWDER, LYOPHILIZED, FOR SOLUTION INTRAVENOUS DAILY
Status: ACTIVE | OUTPATIENT
Start: 2025-04-05

## 2025-04-05 RX ORDER — CEFTRIAXONE 1 G/50ML
1 INJECTION, SOLUTION INTRAVENOUS EVERY 24 HOURS
Status: DISPENSED | OUTPATIENT
Start: 2025-04-06

## 2025-04-05 RX ORDER — INSULIN LISPRO 100 [IU]/ML
0-15 INJECTION, SOLUTION INTRAVENOUS; SUBCUTANEOUS
Status: DISPENSED | OUTPATIENT
Start: 2025-04-05

## 2025-04-05 RX ORDER — BISACODYL 5 MG
10 TABLET, DELAYED RELEASE (ENTERIC COATED) ORAL DAILY PRN
Status: ACTIVE | OUTPATIENT
Start: 2025-04-05

## 2025-04-05 RX ORDER — DEXTROSE 50 % IN WATER (D50W) INTRAVENOUS SYRINGE
12.5
Status: ACTIVE | OUTPATIENT
Start: 2025-04-05

## 2025-04-05 RX ORDER — TALC
3 POWDER (GRAM) TOPICAL NIGHTLY PRN
Status: ACTIVE | OUTPATIENT
Start: 2025-04-05

## 2025-04-05 RX ORDER — ENOXAPARIN SODIUM 100 MG/ML
40 INJECTION SUBCUTANEOUS EVERY 24 HOURS
Status: DISPENSED | OUTPATIENT
Start: 2025-04-05

## 2025-04-05 RX ORDER — BISACODYL 10 MG/1
10 SUPPOSITORY RECTAL DAILY PRN
Status: ACTIVE | OUTPATIENT
Start: 2025-04-05

## 2025-04-05 RX ORDER — ONDANSETRON 4 MG/1
4 TABLET, FILM COATED ORAL EVERY 8 HOURS PRN
Status: ACTIVE | OUTPATIENT
Start: 2025-04-05

## 2025-04-05 RX ORDER — ASPIRIN 81 MG/1
81 TABLET ORAL DAILY PRN
Status: ON HOLD | COMMUNITY

## 2025-04-05 RX ORDER — POTASSIUM CHLORIDE 20 MEQ/1
20 TABLET, EXTENDED RELEASE ORAL ONCE
Status: COMPLETED | OUTPATIENT
Start: 2025-04-05 | End: 2025-04-05

## 2025-04-05 RX ORDER — CEFTRIAXONE 2 G/50ML
2 INJECTION, SOLUTION INTRAVENOUS ONCE
Status: COMPLETED | OUTPATIENT
Start: 2025-04-05 | End: 2025-04-05

## 2025-04-05 RX ADMIN — SODIUM CHLORIDE 1000 ML: 0.9 INJECTION, SOLUTION INTRAVENOUS at 02:08

## 2025-04-05 RX ADMIN — MAGNESIUM SULFATE HEPTAHYDRATE 2 G: 40 INJECTION, SOLUTION INTRAVENOUS at 09:05

## 2025-04-05 RX ADMIN — INSULIN GLARGINE 25 UNITS: 100 INJECTION, SOLUTION SUBCUTANEOUS at 21:22

## 2025-04-05 RX ADMIN — SODIUM CHLORIDE 1000 ML: 0.9 INJECTION, SOLUTION INTRAVENOUS at 09:05

## 2025-04-05 RX ADMIN — ENOXAPARIN SODIUM 40 MG: 40 INJECTION SUBCUTANEOUS at 09:04

## 2025-04-05 RX ADMIN — PANTOPRAZOLE SODIUM 40 MG: 40 TABLET, DELAYED RELEASE ORAL at 09:05

## 2025-04-05 RX ADMIN — POTASSIUM CHLORIDE 20 MEQ: 1500 TABLET, EXTENDED RELEASE ORAL at 09:05

## 2025-04-05 RX ADMIN — CEFTRIAXONE 2 G: 2 INJECTION, SOLUTION INTRAVENOUS at 05:54

## 2025-04-05 RX ADMIN — IOHEXOL 75 ML: 350 INJECTION, SOLUTION INTRAVENOUS at 03:42

## 2025-04-05 RX ADMIN — AZITHROMYCIN DIHYDRATE 500 MG: 500 INJECTION, POWDER, LYOPHILIZED, FOR SOLUTION INTRAVENOUS at 15:48

## 2025-04-05 SDOH — SOCIAL STABILITY: SOCIAL INSECURITY: ARE YOU OR HAVE YOU BEEN THREATENED OR ABUSED PHYSICALLY, EMOTIONALLY, OR SEXUALLY BY ANYONE?: NO

## 2025-04-05 SDOH — ECONOMIC STABILITY: HOUSING INSECURITY: IN THE PAST 12 MONTHS, HOW MANY TIMES HAVE YOU MOVED WHERE YOU WERE LIVING?: 0

## 2025-04-05 SDOH — SOCIAL STABILITY: SOCIAL INSECURITY: ARE THERE ANY APPARENT SIGNS OF INJURIES/BEHAVIORS THAT COULD BE RELATED TO ABUSE/NEGLECT?: NO

## 2025-04-05 SDOH — SOCIAL STABILITY: SOCIAL INSECURITY
WITHIN THE LAST YEAR, HAVE YOU BEEN RAPED OR FORCED TO HAVE ANY KIND OF SEXUAL ACTIVITY BY YOUR PARTNER OR EX-PARTNER?: NO

## 2025-04-05 SDOH — SOCIAL STABILITY: SOCIAL INSECURITY: WITHIN THE LAST YEAR, HAVE YOU BEEN AFRAID OF YOUR PARTNER OR EX-PARTNER?: NO

## 2025-04-05 SDOH — ECONOMIC STABILITY: FOOD INSECURITY: WITHIN THE PAST 12 MONTHS, THE FOOD YOU BOUGHT JUST DIDN'T LAST AND YOU DIDN'T HAVE MONEY TO GET MORE.: NEVER TRUE

## 2025-04-05 SDOH — ECONOMIC STABILITY: HOUSING INSECURITY: AT ANY TIME IN THE PAST 12 MONTHS, WERE YOU HOMELESS OR LIVING IN A SHELTER (INCLUDING NOW)?: NO

## 2025-04-05 SDOH — ECONOMIC STABILITY: FOOD INSECURITY: HOW HARD IS IT FOR YOU TO PAY FOR THE VERY BASICS LIKE FOOD, HOUSING, MEDICAL CARE, AND HEATING?: NOT HARD AT ALL

## 2025-04-05 SDOH — ECONOMIC STABILITY: INCOME INSECURITY: IN THE PAST 12 MONTHS HAS THE ELECTRIC, GAS, OIL, OR WATER COMPANY THREATENED TO SHUT OFF SERVICES IN YOUR HOME?: NO

## 2025-04-05 SDOH — ECONOMIC STABILITY: HOUSING INSECURITY: IN THE LAST 12 MONTHS, WAS THERE A TIME WHEN YOU WERE NOT ABLE TO PAY THE MORTGAGE OR RENT ON TIME?: NO

## 2025-04-05 SDOH — SOCIAL STABILITY: SOCIAL INSECURITY: WITHIN THE LAST YEAR, HAVE YOU BEEN HUMILIATED OR EMOTIONALLY ABUSED IN OTHER WAYS BY YOUR PARTNER OR EX-PARTNER?: NO

## 2025-04-05 SDOH — ECONOMIC STABILITY: FOOD INSECURITY: WITHIN THE PAST 12 MONTHS, YOU WORRIED THAT YOUR FOOD WOULD RUN OUT BEFORE YOU GOT THE MONEY TO BUY MORE.: NEVER TRUE

## 2025-04-05 SDOH — SOCIAL STABILITY: SOCIAL INSECURITY: HAVE YOU HAD THOUGHTS OF HARMING ANYONE ELSE?: NO

## 2025-04-05 SDOH — SOCIAL STABILITY: SOCIAL INSECURITY: DOES ANYONE TRY TO KEEP YOU FROM HAVING/CONTACTING OTHER FRIENDS OR DOING THINGS OUTSIDE YOUR HOME?: NO

## 2025-04-05 SDOH — SOCIAL STABILITY: SOCIAL INSECURITY: ABUSE: ADULT

## 2025-04-05 SDOH — SOCIAL STABILITY: SOCIAL INSECURITY
WITHIN THE LAST YEAR, HAVE YOU BEEN KICKED, HIT, SLAPPED, OR OTHERWISE PHYSICALLY HURT BY YOUR PARTNER OR EX-PARTNER?: NO

## 2025-04-05 SDOH — ECONOMIC STABILITY: TRANSPORTATION INSECURITY: IN THE PAST 12 MONTHS, HAS LACK OF TRANSPORTATION KEPT YOU FROM MEDICAL APPOINTMENTS OR FROM GETTING MEDICATIONS?: NO

## 2025-04-05 SDOH — SOCIAL STABILITY: SOCIAL INSECURITY: HAVE YOU HAD ANY THOUGHTS OF HARMING ANYONE ELSE?: NO

## 2025-04-05 SDOH — SOCIAL STABILITY: SOCIAL INSECURITY: DO YOU FEEL ANYONE HAS EXPLOITED OR TAKEN ADVANTAGE OF YOU FINANCIALLY OR OF YOUR PERSONAL PROPERTY?: NO

## 2025-04-05 SDOH — SOCIAL STABILITY: SOCIAL INSECURITY: DO YOU FEEL UNSAFE GOING BACK TO THE PLACE WHERE YOU ARE LIVING?: NO

## 2025-04-05 SDOH — SOCIAL STABILITY: SOCIAL INSECURITY: WERE YOU ABLE TO COMPLETE ALL THE BEHAVIORAL HEALTH SCREENINGS?: YES

## 2025-04-05 SDOH — SOCIAL STABILITY: SOCIAL INSECURITY: HAS ANYONE EVER THREATENED TO HURT YOUR FAMILY OR YOUR PETS?: NO

## 2025-04-05 ASSESSMENT — LIFESTYLE VARIABLES
AUDIT-C TOTAL SCORE: 0
EVER HAD A DRINK FIRST THING IN THE MORNING TO STEADY YOUR NERVES TO GET RID OF A HANGOVER: NO
SKIP TO QUESTIONS 9-10: 1
EVER FELT BAD OR GUILTY ABOUT YOUR DRINKING: NO
HOW OFTEN DO YOU HAVE 6 OR MORE DRINKS ON ONE OCCASION: NEVER
HAVE YOU EVER FELT YOU SHOULD CUT DOWN ON YOUR DRINKING: NO
TOTAL SCORE: 0
HOW OFTEN DO YOU HAVE A DRINK CONTAINING ALCOHOL: NEVER
AUDIT-C TOTAL SCORE: 0
HAVE PEOPLE ANNOYED YOU BY CRITICIZING YOUR DRINKING: NO
HOW MANY STANDARD DRINKS CONTAINING ALCOHOL DO YOU HAVE ON A TYPICAL DAY: PATIENT DOES NOT DRINK

## 2025-04-05 ASSESSMENT — COGNITIVE AND FUNCTIONAL STATUS - GENERAL
MOBILITY SCORE: 15
EATING MEALS: A LITTLE
DRESSING REGULAR UPPER BODY CLOTHING: A LITTLE
WALKING IN HOSPITAL ROOM: A LITTLE
MOVING FROM LYING ON BACK TO SITTING ON SIDE OF FLAT BED WITH BEDRAILS: A LITTLE
HELP NEEDED FOR BATHING: A LITTLE
CLIMB 3 TO 5 STEPS WITH RAILING: A LITTLE
MOVING TO AND FROM BED TO CHAIR: A LOT
WALKING IN HOSPITAL ROOM: A LITTLE
DAILY ACTIVITIY SCORE: 16
STANDING UP FROM CHAIR USING ARMS: A LITTLE
HELP NEEDED FOR BATHING: A LOT
HELP NEEDED FOR BATHING: A LITTLE
WALKING IN HOSPITAL ROOM: A LITTLE
DRESSING REGULAR UPPER BODY CLOTHING: A LITTLE
DRESSING REGULAR LOWER BODY CLOTHING: A LITTLE
CLIMB 3 TO 5 STEPS WITH RAILING: A LITTLE
DRESSING REGULAR UPPER BODY CLOTHING: A LITTLE
TOILETING: A LITTLE
EATING MEALS: A LITTLE
CLIMB 3 TO 5 STEPS WITH RAILING: A LOT
MOBILITY SCORE: 20
TOILETING: A LOT
DAILY ACTIVITIY SCORE: 19
MOVING TO AND FROM BED TO CHAIR: A LITTLE
DRESSING REGULAR LOWER BODY CLOTHING: A LITTLE
TURNING FROM BACK TO SIDE WHILE IN FLAT BAD: A LITTLE
MOBILITY SCORE: 20
MOVING TO AND FROM BED TO CHAIR: A LITTLE
STANDING UP FROM CHAIR USING ARMS: A LITTLE
DAILY ACTIVITIY SCORE: 19
STANDING UP FROM CHAIR USING ARMS: A LOT
DRESSING REGULAR LOWER BODY CLOTHING: A LOT
EATING MEALS: A LITTLE
PATIENT BASELINE BEDBOUND: NO
TOILETING: A LITTLE

## 2025-04-05 ASSESSMENT — ENCOUNTER SYMPTOMS
FEVER: 0
WHEEZING: 0
PALPITATIONS: 0
JOINT SWELLING: 0
DIARRHEA: 0
DYSURIA: 0
FREQUENCY: 0
AGITATION: 0
APPETITE CHANGE: 0
ACTIVITY CHANGE: 0
DIZZINESS: 0
VOMITING: 0
DECREASED CONCENTRATION: 0
WOUND: 0
DIAPHORESIS: 0
ABDOMINAL DISTENTION: 0
STRIDOR: 0
CONSTIPATION: 0
COUGH: 1
COLOR CHANGE: 0
HEADACHES: 0
CONFUSION: 0
BACK PAIN: 0
SINUS PAIN: 0
ABDOMINAL PAIN: 0
NERVOUS/ANXIOUS: 0
DIFFICULTY URINATING: 0
BLOOD IN STOOL: 1
FLANK PAIN: 0
RHINORRHEA: 0
NUMBNESS: 0
APNEA: 0
MYALGIAS: 0
SORE THROAT: 0
LIGHT-HEADEDNESS: 0
ARTHRALGIAS: 0
CHEST TIGHTNESS: 0
NAUSEA: 0
SHORTNESS OF BREATH: 0
HEMATURIA: 0
CHILLS: 1
FATIGUE: 1
WEAKNESS: 0

## 2025-04-05 ASSESSMENT — ACTIVITIES OF DAILY LIVING (ADL)
PATIENT'S MEMORY ADEQUATE TO SAFELY COMPLETE DAILY ACTIVITIES?: YES
BATHING: NEEDS ASSISTANCE
TOILETING: NEEDS ASSISTANCE
DRESSING YOURSELF: NEEDS ASSISTANCE
WALKS IN HOME: INDEPENDENT
ADEQUATE_TO_COMPLETE_ADL: YES
LACK_OF_TRANSPORTATION: NO
LACK_OF_TRANSPORTATION: NO
GROOMING: NEEDS ASSISTANCE
HEARING - LEFT EAR: FUNCTIONAL
FEEDING YOURSELF: INDEPENDENT
JUDGMENT_ADEQUATE_SAFELY_COMPLETE_DAILY_ACTIVITIES: YES
ASSISTIVE_DEVICE: OTHER (COMMENT)
HEARING - RIGHT EAR: FUNCTIONAL

## 2025-04-05 ASSESSMENT — PATIENT HEALTH QUESTIONNAIRE - PHQ9
2. FEELING DOWN, DEPRESSED OR HOPELESS: NOT AT ALL
1. LITTLE INTEREST OR PLEASURE IN DOING THINGS: NOT AT ALL
SUM OF ALL RESPONSES TO PHQ9 QUESTIONS 1 & 2: 0

## 2025-04-05 ASSESSMENT — PAIN - FUNCTIONAL ASSESSMENT
PAIN_FUNCTIONAL_ASSESSMENT: 0-10

## 2025-04-05 ASSESSMENT — COLUMBIA-SUICIDE SEVERITY RATING SCALE - C-SSRS
1. IN THE PAST MONTH, HAVE YOU WISHED YOU WERE DEAD OR WISHED YOU COULD GO TO SLEEP AND NOT WAKE UP?: NO
6. HAVE YOU EVER DONE ANYTHING, STARTED TO DO ANYTHING, OR PREPARED TO DO ANYTHING TO END YOUR LIFE?: NO
2. HAVE YOU ACTUALLY HAD ANY THOUGHTS OF KILLING YOURSELF?: NO

## 2025-04-05 ASSESSMENT — PAIN SCALES - GENERAL
PAINLEVEL_OUTOF10: 0 - NO PAIN

## 2025-04-05 NOTE — ED PROVIDER NOTES
HPI   Chief Complaint   Patient presents with    Weakness, Gen       Patient presents emergency department for generalized weakness.  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  Patient is just does not feel well and has some nausea at home.  Patient is medically probably before.  No reported hemorrhoids.  Patient's status post right below-knee amputation.  Patient has a chronic wound to his left lateral ankle that is healing well, found to have a sacral cubitus ulcer.              Patient History   No past medical history on file.  No past surgical history on file.  Family History   Problem Relation Name Age of Onset    Diabetes Mother      Diabetes Father       Social History     Tobacco Use    Smoking status: Every Day     Current packs/day: 2.00     Types: Cigarettes    Smokeless tobacco: Not on file   Substance Use Topics    Alcohol use: Not on file    Drug use: Not on file       Physical Exam   ED Triage Vitals [04/05/25 0051]   Temperature Heart Rate Respirations BP   36.2 °C (97.2 °F) (!) 111 18 129/67      Pulse Ox Temp Source Heart Rate Source Patient Position   95 % Temporal Monitor Lying      BP Location FiO2 (%)     Right arm --       Physical Exam  Vitals and nursing note reviewed.   Constitutional:       General: He is not in acute distress.     Appearance: He is well-developed.   HENT:      Head: Normocephalic and atraumatic.      Right Ear: External ear normal.      Left Ear: External ear normal.      Mouth/Throat:      Mouth: Mucous membranes are moist.      Pharynx: Oropharynx is clear.   Eyes:      Extraocular Movements: Extraocular movements intact.      Conjunctiva/sclera: Conjunctivae normal.   Neck:      Trachea: Trachea normal.   Cardiovascular:      Rate and Rhythm: Tachycardia present.   Pulmonary:      Effort: Pulmonary effort is normal. No respiratory distress.      Breath sounds:  Rhonchi present.   Abdominal:      General: Bowel sounds are normal.      Palpations: Abdomen is soft.      Tenderness: There is no abdominal tenderness.   Musculoskeletal:         General: Deformity (Right below-knee amputation.) present. No swelling or tenderness.      Cervical back: No tenderness.   Skin:     General: Skin is warm and dry.      Capillary Refill: Capillary refill takes less than 2 seconds.      Findings: Lesion (Well-healing wound to the left lateral ankle) present.   Neurological:      General: No focal deficit present.      Mental Status: He is alert and oriented to person, place, and time.   Psychiatric:         Mood and Affect: Mood normal.         Thought Content: Thought content does not include homicidal or suicidal ideation.           ED Course & MDM   ED Course as of 04/05/25 0554   Sat Apr 05, 2025   0336 PNA on CXR, sepsis identified at this time []      ED Course User Index  [] Rene Granado MD         Diagnoses as of 04/05/25 0554   Sepsis without acute organ dysfunction, due to unspecified organism (Multi)   Pneumonia due to organism                 No data recorded     [place] Coma Scale Score: 14 (04/05/25 0055 : Caitlin Richards RN)                           Medical Decision Making  Patient presents with generalized weakness and nausea. Available chart reviewed. On initial assessment the patient was found non-toxic, no acute distress, tachycardia cardiac and afebrile. Initial concern for sepsis, diverticulitis, diverticulosis, GI bleed, pneumonia, urinary tract infection.  CBC shows leukocytosis of 18, neighborhood of 11.4, no thrombocytopenia.  Lactic show 1.6.  Magnesium is low at 1.41.  Metabolic panel shows slight hypokalemia 3.4, elevated creatinine from his baseline at 1.35, normal liver function.  Blood cultures pending.  Chest x-ray is read as diffuse interstitial opacities concerning for edema or CHF as well as consolidation of the right lung apex suspicious for  pneumonia with recommendations for CT.  CT of the abdomen pelvis shows diverticulosis without diverticulitis, no active bleed, pneumonia in the lung base,, colitis.  There are enlarged lymph nodes concerning for neoplasm with recommendation for nonemergent CT.  Patient started on ceftriaxone azithromycin.  Patient be admitted for further management.    Amount and/or Complexity of Data Reviewed  ECG/medicine tests: independent interpretation performed.     Details: EKG obtained at 00 59 interpreted by myself shows sinus tachycardia, rate 110, , QRS is 103 QTc is 470, no ST segment changes, no T wave changes        Procedure  Critical Care    Performed by: Rene Granado MD  Authorized by: Rene Granado MD    Critical care provider statement:     Critical care time (minutes):  30    Critical care time was exclusive of:  Separately billable procedures and treating other patients and teaching time    Critical care was necessary to treat or prevent imminent or life-threatening deterioration of the following conditions:  Sepsis    Critical care was time spent personally by me on the following activities:  Discussions with consultants, obtaining history from patient or surrogate, re-evaluation of patient's condition, ordering and review of laboratory studies, development of treatment plan with patient or surrogate, evaluation of patient's response to treatment and examination of patient    Care discussed with: admitting provider         Rene Granado MD  04/05/25 0606       Rene Granado MD  04/05/25 9817

## 2025-04-05 NOTE — PROGRESS NOTES
"Nancy Vogt [id number]   Service: Internal Medicine / Hospitalist Date of service: 4/5/2025                          Full Code                    Subjective        History Of Present Illness (HPI):  Nancy Vogt is a 75 y.o. 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 Pt has been feeling generally unwell for the past few days. He has had some chills and a dry cough. No shortness of breath, fevers, nausea, vomiting, chest pain, or abdominal pain. He says his family members have \"always been getting sick\" around him. He continues to smoke 1 ppd and has smoked for decades. He denies alcohol or illicit drug use. While in the ER he was noted to have a bloody bowel movement with bright red hematochezia. No known hemorrhoids. Pt has never had a colonoscopy per him. Pt's only other main medical issue is ID-DM2 and pt says he is compliant with his insulin.     ED Course:   Vitals on presentation: T 36.2 °C (97.2 °F)  HR (!) 111 -> 100 bpm  /67  RR 18  O2 95 % None (Room air)  Labs:   CBC with WBC 17.8, Hgb 11.4, Plts 393.   CMP with glucose 90, Na 139, K 3.4, BUN 20, sCr 1.35, alk phos 84, ALT 9, AST 19, bilirubin 0.5. Magnesium 1.41.   Lactate 1.6  EKG: sinus tach at 110 bpm, no ST changes  Imaging - CXR - 1.  New diffuse interstitial opacities may relate to chronic changes   or acute pulmonary interstitial edema/CHF. There is consolidative   opacity in the right lung apex, suspicious for pneumonia. Underlying   malignancy is not excluded. Recommend further evaluation with chest   CT. Mild left basilar atelectasis. No sizable pleural effusion. No   pneumothorax. Similar asymmetric elevation of the left hemidiaphragm.   CTA a/p w/wout contrast - There is submucosal edema and mural thickening in the colon, most   pronounced involving the sigmoid colon, compatible with colitis.   Underlying neoplasm is not excluded and " correlation with endoscopy is   recommended following resolution of patient's acute illness seen   without recently performed to exclude colorectal carcinoma. Colonic   diverticula are present without evidence of acute diverticulitis. No   abnormal enhancement is seen within the GI tract to suggest an active   site of bleeding at this time.       Consolidation posteriorly in the left lung base, compatible with   pneumonia. Patchy consolidative opacity in the inferior lingula also   noted, which may relate to atelectasis or pneumonia.       Enlarged size and more solid appearance of rounded nodule in the   right lower lobe measuring up to 9 mm in diameter, worrisome for   neoplasia (series 11, images ). Recommend further evaluation   with completion chest CT on a nonemergent basis. A yellow alert was   sent.      Severe atherosclerosis, with moderate to severe narrowing of the   origins of the celiac axis and SMA and right renal artery, severe   narrowing of the origin of the left renal artery and proximal SHI,   moderate to severe diffuse narrowing of bilateral common, external   and iliac arteries and distal branches. No aneurysmal dilation is   seen.   Nonobstructing left renal calculus incidentally noted.   Large left diaphragmatic hernia again noted with intrathoracic   location of stomach, spleen and splenic flexure of the colon,   multiple small bowel loops and tail of the pancreas.   agree with radiology interpretation(s):   Interventions: Rocephin and azithromycin started, NS 1 L bolus, Pt admitted for further care.    4/5...........................Patient reported overall  he felt a bit better today.  No reported: Headaches, chest pains, nausea, vomiting, fevers or chills.        Review of Systems:   Review of system otherwise negative if not aforementioned above in subjective.    Objective              Physical Exam     Constitutional:       Appearance: Patient appeared in no acute cardiopulmonary  distress.     Comments: Patient alert and oriented to :person ,place ,time, and situation.  HEENT:      Head: Normocephalic and atraumatic.Trachea midline      Nose:No observed congestion or rhinorrhea.     Mouth/Throat: Mucous membranes Moist, Trachea appeared  midline.  Eyes:      Extraocular Movements: Extraocular movements intact.      Pupils: Pupils are equal, round, and reactive to light.      Comments: No scleral icterus or conjunctival injection appreciated.   Cardiovascular:      Rate and Rhythm: Normal rate and regular rhythm. No clicks rubs or gallops, normal S1 and S2.No peripheral stigmata of endocarditis appreciated.     Pulmonary:      Diminished lung bases but no adventitious sound appreciated.  Abdominal:      General: Abdomen soft, nontender, active bowel sounds, no involuntary guarding or rebound tenderness appreciated.     Comments: None   Musculoskeletal:      Comment: Right below the knee amputation.   No pitting edema or cyanosis appreciated.       Skin:     General: Skin is warm.      Coloration:  No jaundice     Findings: No abnormal appearing skin rashes or lesions that appeared acute noted on unclothed area of the skin..   Neurological:      General: No focal sensory or motor deficits appreciated, no meningeal signs or dysmetria noted.      Cranial Nerves: Cranial nerves II to XII appearing grossly intact.     Genitals:  Deferred  Psychiatric:         The patient appears to be displaying normal mood and affect at the time of evaluation.    Labs:     Lab Results   Component Value Date    GLUCOSE 90 04/05/2025    CALCIUM 8.6 04/05/2025     04/05/2025    K 3.4 (L) 04/05/2025    CO2 26 04/05/2025     04/05/2025    BUN 20 04/05/2025    CREATININE 1.35 (H) 04/05/2025      Lab Results   Component Value Date    WBC 17.8 (H) 04/05/2025    HGB 11.4 (L) 04/05/2025    HCT 36.6 (L) 04/05/2025    MCV 92 04/05/2025     04/05/2025      [unfilled]   [unfilled]   No results  found for the last 90 days.              X-rays/ Images    [unfilled]   Radiology Results (last 21 days)    Procedure Component Value Units Date/Time   CT chest wo IV contrast [[id number]] Collected: 04/05/25 0725   Order Status: Completed Updated: 04/05/25 0725   Narrative:     Interpreted By:  Hector Saavedra,  STUDY:  CT CHEST WO IV CONTRAST;  4/5/2025 6:24 am      INDICATION:  Signs/Symptoms:readiology recomendation, PNA.          COMPARISON:  None.      ACCESSION NUMBER(S):  [id number]      ORDERING CLINICIAN:  GENNY TONEY      TECHNIQUE:  Helical data acquisition of the chest was obtained without the use of  IV contrast. Images were reformatted in axial, coronal, and sagittal  planes.      FINDINGS:  POTENTIAL LIMITATIONS OF THE STUDY:   Lack of IV contrast      HEART AND VESSELS:      There are atherosclerotic calcifications of the aorta and its  branches. Aorta is normal in course and caliber.      The heart is not significantly enlarged.      No pericardial effusion is seen.      MEDIASTINUM AND MIKHAIL, LOWER NECK AND AXILLA:  The visualized thyroid gland is within normal limits.      There are numerous small hilar and mediastinal lymph nodes which are  not pathologically enlarged by size criteria, some of which are  calcified. No axillary lymphadenopathy.      Esophagus appears within normal limits as seen.      LUNGS AND AIRWAYS:  The trachea and central airways are patent. No endobronchial lesion.  Mild secretions and/or aspiration within the trachea and a few more  peripheral airways.      Patchy consolidative opacities noted in the right upper lobe, left  lower lobe, as lesser degree the right lower lobe. The findings are  nonspecific may relate to pneumonia but follow-up is recommended.  There is a nodule within the right lower lobe which measures 1.0 cm  in greatest dimension, image 281 of 380. This was visible on a  previous CT abdomen pelvis of 02/03/2011 but only measured  approximately 5-6 mm at that  time. PET scan may be useful to assess  for hypermetabolic activity. Emphysematous changes, most severe in  the upper lobes. No sizable effusion. No evidence of a pneumothorax.  Elevated left hemidiaphragm.      UPPER ABDOMEN:  Described on a separate CT abdomen and pelvis report.      CHEST WALL AND OSSEOUS STRUCTURES:  No acute process. Degenerative changes.       Impression:     Patchy consolidative opacities in the right upper lobe, left lower  lobe, and to a lesser degree the right lower lobe. These are  nonspecific but may relate to pneumonia. After treatment, follow-up  recommended to document resolution.  10 mm right lower lobe nodule which appears to have increased in size  when compared to a previous CT abdomen pelvis of 02/03/2011 when it  measured approximately 5-6 mm. PET scan may be useful to assess for  hypermetabolic activity as slow growing neoplasm is not excluded.      MACRO:  None.      Signed by: Hector Saavedra 4/5/2025 7:23 AM  Dictation workstation:   [id number]   CT angio abdomen pelvis w and or wo IV IV contrast [[id number]] Collected: 04/05/25 0414   Order Status: Completed Updated: 04/05/25 0414   Narrative:     Interpreted By:  Valente Cleaning,  STUDY:  CT ANGIO ABDOMEN PELVIS W AND/OR WO IV IV CONTRAST; ;  4/5/2025 3:35  am      INDICATION:  Signs/Symptoms:nausea, blood bowel movement.          COMPARISON:  02/03/2011.      ACCESSION NUMBER(S):  [id number]      ORDERING CLINICIAN:  GENNY TONEY      TECHNIQUE:  Noncontrast CT of the abdomen and pelvis was followed by CT  angiography of the abdomen and pelvis after IV administration of 75  cc Omnipaque 350. Multiplanar, MIP and 3D reformations.      FINDINGS:      LOWER CHEST: Consolidation posteriorly in the left lung base,  compatible with pneumonia. Patchy consolidative opacity in the  inferior lingula also noted, which may relate to atelectasis or  pneumonia. Enlarged size and more solid appearance of rounded nodule  in the right lower  lobe measuring up to 9 mm in diameter, worrisome  for neoplasia (series 11, images ). Additional patchy  subpleural small opacities in the right lower lung probably relate to  atelectasis or scarring. Severe emphysema noted. Normal heart size.  Mitral annular and coronary artery calcifications. Large left  diaphragmatic hernia again noted with intrathoracic location of  stomach, spleen and splenic flexure of the colon, multiple small  bowel loops and tail of the pancreas. BONES: No acute osseous  abnormality. Moderate to severe degenerative changes of the imaged  spine, worst at L5-S1 level ABDOMINAL WALL: Within normal limits.      ABDOMEN:      LIVER: Normal in size. No focal lesion is seen.  BILE DUCTS: Normal caliber.  GALLBLADDER: No calcified gallstones. No wall thickening.  PANCREAS: Within normal limits.  SPLEEN: Within normal limits.  ADRENALS: Within normal limits.  KIDNEYS and URETERS: 5 mm nonobstructing calculus in the left upper  pole. Incidental small left lower pole cortical cyst. Otherwise,  normal.          VESSELS: Severe atherosclerosis, with moderate to severe narrowing of  the origins of the celiac axis and SMA and right renal artery, severe  narrowing of the origin of the left renal artery and proximal SHI,  moderate to severe diffuse narrowing of bilateral common, external  and iliac arteries and distal branches. No aneurysmal dilation is  seen. RETROPERITONEUM: No pathologically enlarged retroperitoneal  lymph nodes.      PELVIS:      REPRODUCTIVE ORGANS: Prostate is not enlarged.  BLADDER: Trabeculated bladder wall suggests sequelae of chronic  bladder outlet obstruction.      BOWEL: Stomach and small bowel appear within normal limits. There is  submucosal edema and mural thickening in the colon, most pronounced  involving the sigmoid colon, compatible with colitis. Underlying  neoplasm is not excluded and correlation with endoscopy is  recommended following resolution of patient's  acute illness seen  without recently performed to exclude colorectal carcinoma. Colonic  diverticula are present without evidence of acute diverticulitis. No  abnormal enhancement is seen within the GI tract to suggest an active  site of bleeding at this time.  Normal appendix. PERITONEUM: No  ascites or free air, no fluid collection.       Impression:     There is submucosal edema and mural thickening in the colon, most  pronounced involving the sigmoid colon, compatible with colitis.  Underlying neoplasm is not excluded and correlation with endoscopy is  recommended following resolution of patient's acute illness seen  without recently performed to exclude colorectal carcinoma. Colonic  diverticula are present without evidence of acute diverticulitis. No  abnormal enhancement is seen within the GI tract to suggest an active  site of bleeding at this time.      Consolidation posteriorly in the left lung base, compatible with  pneumonia. Patchy consolidative opacity in the inferior lingula also  noted, which may relate to atelectasis or pneumonia.      Enlarged size and more solid appearance of rounded nodule in the  right lower lobe measuring up to 9 mm in diameter, worrisome for  neoplasia (series 11, images ). Recommend further evaluation  with completion chest CT on a nonemergent basis. A yellow alert was  sent.      Severe atherosclerosis, with moderate to severe narrowing of the  origins of the celiac axis and SMA and right renal artery, severe  narrowing of the origin of the left renal artery and proximal SHI,  moderate to severe diffuse narrowing of bilateral common, external  and iliac arteries and distal branches. No aneurysmal dilation is  seen.      Nonobstructing left renal calculus incidentally noted.      Large left diaphragmatic hernia again noted with intrathoracic  location of stomach, spleen and splenic flexure of the colon,  multiple small bowel loops and tail of the pancreas.       Additional findings as discussed above      MACRO:  Critical Finding:  See findings. Notification was initiated on  4/5/2025 at 4:11 am by  Valente Cleaning.  (**-YCF-**) Instructions:      Signed by: Valente Cleaning 4/5/2025 4:13 AM  Dictation workstation:   [id number]   XR chest 1 view [[id number]] Collected: 04/05/25 0337   Order Status: Completed Updated: 04/05/25 0337   Narrative:     Interpreted By:  Valente Cleaning,  STUDY:  XR CHEST 1 VIEW;  4/5/2025 3:13 am      INDICATION:  Signs/Symptoms:weakness, leukocytosis.          COMPARISON:  Chest radiography of 03/05/2013.      ACCESSION NUMBER(S):  [id number]      ORDERING CLINICIAN:  GENNY TONEY      FINDINGS:  AP radiograph of the chest was provided.              CARDIOMEDIASTINAL SILHOUETTE:  Cardiomediastinal silhouette is normal in size and configuration.  Calcific atherosclerosis of thoracic aorta. Calcified left hilar  lymph nodes again seen, in keeping with sequelae of chronic  granulomatous disease.      LUNGS:  New diffuse interstitial opacities may relate to chronic changes or  acute pulmonary interstitial edema/CHF. There is consolidative  opacity in the right lung apex, suspicious for pneumonia. Underlying  malignancy is not excluded. Recommend further evaluation with chest  CT. Mild left basilar atelectasis. No sizable pleural effusion. No  pneumothorax. Similar asymmetric elevation of the left hemidiaphragm.      ABDOMEN:  No remarkable upper abdominal findings.      BONES:  No acute osseous changes.       Impression:     1.  New diffuse interstitial opacities may relate to chronic changes  or acute pulmonary interstitial edema/CHF. There is consolidative  opacity in the right lung apex, suspicious for pneumonia. Underlying  malignancy is not excluded. Recommend further evaluation with chest  CT. Mild left basilar atelectasis. No sizable pleural effusion. No  pneumothorax. Similar asymmetric elevation of the left hemidiaphragm.               MACRO:  None      Signed by: Valente Cleaning 4/5/2025 3:36 AM  Dictation workstation:   [id number]             Medical Problems       Problem List       * (Principal) Pneumonia due to organism    Long term (current) use of insulin (Multi)    Type 2 diabetes mellitus    Tobacco abuse    Sepsis (Multi)    Cigarette nicotine dependence without complication    Colitis               Above medical problems may be reflective of historical medical problems that may have resolved and may not related to acute clinical condition/medical problems.    Clinical impression/plan:        75 y.o. 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       LLL pneumonia, sepsis:  CT showing L lung basilar pneumonia and in inferior lingula.  Start Rocephin and azithromycin  Sepsis bolus ordered - pt is septic with tachycardia and WBC count 17.8.  Bcx X2 ordered. Lactate 1.6.  Wean O2 as tolerated  Check urine strep pneumo and legionella.     IDDM2:  Continue home Lantus 25 units HS  Start Ssi while inpatient  Check A1c  Accuchecks, hypoglycemic protocol     Colitis vs possible neoplasm, hematochezia:  Ct showing edema and mural thickening of sigmoid colon, possible neoplasm.  Recommend outpatient colonoscopy after discharge if no other issues arise.  Give lack of GI symptoms will monitor this for now.  Hg 11.4 currently, while his baseline is around 9-10. Will continue to monitor for further hematochezia.  Protonix started     RLL - 9 mm nodule, worrisome for neoplasia - recommend outpatient follow-up for this. CT chest ordered.     L diaphragmatic hernia with intrathoracic location of abdominal organs - likely not a surgical candidate for this type of thoracic surgery. Continue to monitor.     Cigarette nicotine dependence - recommended smoking cessation                                                                        Disposition/additional care  plan/interventions: 4/5/2025    Hypokalemia.................. replete potassium      Continue empirical antibiotics    Blood cultures in progress    Legionella and Streptococcus urine antigen in process.    Radiographic abnormalities, possible neoplasm/CT showing edema and mural thickening of the sigmoid colon, right lower lobe 9 mm nodule worrisome for neoplasm, patient inform concerning importance of follow-up.    It is of paramount importance that patient follow-up with subspecialties concerning abnormal radiographic findings including the colon and the lung.      Benign-appearing abdominal examination without GI symptoms a lower clinical suspicion of colitis but will continue monitoring    If clinical suspicion of colitis, add anaerobic coverage.    Trend leukocytosis    Trend fever curve    Magnesium and potassium supplementation given    The patient was informed of differential diagnosis , work up , plan of care and possible sequelae of clinical disposition.Patient in agreement with plan of care. Further recommendations forthcoming in accordance with patient's clinical disposition and response to care.    Discharge planning:Discharge timing to be determined.               Dictation performed with assistance of voice recognition device therefore transcription errors are possible.

## 2025-04-05 NOTE — H&P
"[facility] - GENERAL MEDICINE HISTORY AND PHYSICAL    HISTORY OF PRESENT ILLNESS     History Obtained From (Primary Source): Patient  Collateral History (Secondary Sources): D/w ED physician    History Of Present Illness (HPI):  Nancy Vogt is a 75 y.o. 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 Pt has been feeling generally unwell for the past few days. He has had some chills and a dry cough. No shortness of breath, fevers, nausea, vomiting, chest pain, or abdominal pain. He says his family members have \"always been getting sick\" around him. He continues to smoke 1 ppd and has smoked for decades. He denies alcohol or illicit drug use. While in the ER he was noted to have a bloody bowel movement with bright red hematochezia. No known hemorrhoids. Pt has never had a colonoscopy per him. Pt's only other main medical issue is ID-DM2 and pt says he is compliant with his insulin.    ED Course:   Vitals on presentation: T 36.2 °C (97.2 °F)  HR (!) 111 -> 100 bpm  /67  RR 18  O2 95 % None (Room air)  Labs:   CBC with WBC 17.8, Hgb 11.4, Plts 393.   CMP with glucose 90, Na 139, K 3.4, BUN 20, sCr 1.35, alk phos 84, ALT 9, AST 19, bilirubin 0.5. Magnesium 1.41.   Lactate 1.6  EKG: sinus tach at 110 bpm, no ST changes  Imaging - CXR - 1.  New diffuse interstitial opacities may relate to chronic changes   or acute pulmonary interstitial edema/CHF. There is consolidative   opacity in the right lung apex, suspicious for pneumonia. Underlying   malignancy is not excluded. Recommend further evaluation with chest   CT. Mild left basilar atelectasis. No sizable pleural effusion. No   pneumothorax. Similar asymmetric elevation of the left hemidiaphragm.   CTA a/p w/wout contrast - There is submucosal edema and mural thickening in the colon, most   pronounced involving the sigmoid colon, compatible with colitis. "   Underlying neoplasm is not excluded and correlation with endoscopy is   recommended following resolution of patient's acute illness seen   without recently performed to exclude colorectal carcinoma. Colonic   diverticula are present without evidence of acute diverticulitis. No   abnormal enhancement is seen within the GI tract to suggest an active   site of bleeding at this time.       Consolidation posteriorly in the left lung base, compatible with   pneumonia. Patchy consolidative opacity in the inferior lingula also   noted, which may relate to atelectasis or pneumonia.       Enlarged size and more solid appearance of rounded nodule in the   right lower lobe measuring up to 9 mm in diameter, worrisome for   neoplasia (series 11, images ). Recommend further evaluation   with completion chest CT on a nonemergent basis. A yellow alert was   sent.      Severe atherosclerosis, with moderate to severe narrowing of the   origins of the celiac axis and SMA and right renal artery, severe   narrowing of the origin of the left renal artery and proximal SHI,   moderate to severe diffuse narrowing of bilateral common, external   and iliac arteries and distal branches. No aneurysmal dilation is   seen.   Nonobstructing left renal calculus incidentally noted.   Large left diaphragmatic hernia again noted with intrathoracic   location of stomach, spleen and splenic flexure of the colon,   multiple small bowel loops and tail of the pancreas.   agree with radiology interpretation(s):   Interventions: Rocephin and azithromycin started, NS 1 L bolus, Pt admitted for further care.    12-point ROS reviewed and found to be negative aside from aforementioned positives in HPI and/or noted in dedicated ROS section below.     Decision made to admit the patient to the hospitalist service after evaluation of the patient, review of the above, and discussion with ED provider.     LABS AND IMAGING     I have personally reviewed the  following labs on 04/05/25: CBC, CMP, Mag, and Lactate  I have personally reviewed the following imaging studies on 04/05/25: CXR and CT Abd/Pelvis, with my personal interpretations as documented in ED course above.   I have personally reviewed any obtained EKGs on 04/05/25, with my interpretation as listed above in the ED summary course.   I have personally reviewed the patient's vitals on presentation to the ED and any/all changes through to time of admission (on 04/05/25).     ED Course (From ED Provider):  ED Course as of 04/05/25 0639   Sat Apr 05, 2025   0336 PNA on CXR, sepsis identified at this time []      ED Course User Index  [] Rene Granado MD         Diagnoses as of 04/05/25 0639   Sepsis without acute organ dysfunction, due to unspecified organism (Multi)   Pneumonia due to organism     Relevant Results  Results for orders placed or performed during the hospital encounter of 04/05/25 (from the past 24 hours)   CBC and Auto Differential   Result Value Ref Range    WBC 17.8 (H) 4.4 - 11.3 x10*3/uL    nRBC 0.0 0.0 - 0.0 /100 WBCs    RBC 3.99 (L) 4.50 - 5.90 x10*6/uL    Hemoglobin 11.4 (L) 13.5 - 17.5 g/dL    Hematocrit 36.6 (L) 41.0 - 52.0 %    MCV 92 80 - 100 fL    MCH 28.6 26.0 - 34.0 pg    MCHC 31.1 (L) 32.0 - 36.0 g/dL    RDW 13.9 11.5 - 14.5 %    Platelets 393 150 - 450 x10*3/uL    Neutrophils % 95.6 40.0 - 80.0 %    Immature Granulocytes %, Automated 0.4 0.0 - 0.9 %    Lymphocytes % 2.0 13.0 - 44.0 %    Monocytes % 1.6 2.0 - 10.0 %    Eosinophils % 0.1 0.0 - 6.0 %    Basophils % 0.3 0.0 - 2.0 %    Neutrophils Absolute 17.03 (H) 1.60 - 5.50 x10*3/uL    Immature Granulocytes Absolute, Automated 0.08 0.00 - 0.50 x10*3/uL    Lymphocytes Absolute 0.36 (L) 0.80 - 3.00 x10*3/uL    Monocytes Absolute 0.29 0.05 - 0.80 x10*3/uL    Eosinophils Absolute 0.02 0.00 - 0.40 x10*3/uL    Basophils Absolute 0.06 0.00 - 0.10 x10*3/uL   Comprehensive metabolic panel   Result Value Ref Range    Glucose 90 74 - 99  mg/dL    Sodium 139 136 - 145 mmol/L    Potassium 3.4 (L) 3.5 - 5.3 mmol/L    Chloride 104 98 - 107 mmol/L    Bicarbonate 26 21 - 32 mmol/L    Anion Gap 12 10 - 20 mmol/L    Urea Nitrogen 20 6 - 23 mg/dL    Creatinine 1.35 (H) 0.50 - 1.30 mg/dL    eGFR 55 (L) >60 mL/min/1.73m*2    Calcium 8.6 8.6 - 10.3 mg/dL    Albumin 3.4 3.4 - 5.0 g/dL    Alkaline Phosphatase 84 33 - 136 U/L    Total Protein 7.5 6.4 - 8.2 g/dL    AST 19 9 - 39 U/L    Bilirubin, Total 0.5 0.0 - 1.2 mg/dL    ALT 9 (L) 10 - 52 U/L   Magnesium   Result Value Ref Range    Magnesium 1.41 (L) 1.60 - 2.40 mg/dL   Lactate   Result Value Ref Range    Lactate 1.6 0.4 - 2.0 mmol/L      Imaging  CT angio abdomen pelvis w and or wo IV IV contrast    Result Date: 4/5/2025  There is submucosal edema and mural thickening in the colon, most pronounced involving the sigmoid colon, compatible with colitis. Underlying neoplasm is not excluded and correlation with endoscopy is recommended following resolution of patient's acute illness seen without recently performed to exclude colorectal carcinoma. Colonic diverticula are present without evidence of acute diverticulitis. No abnormal enhancement is seen within the GI tract to suggest an active site of bleeding at this time.   Consolidation posteriorly in the left lung base, compatible with pneumonia. Patchy consolidative opacity in the inferior lingula also noted, which may relate to atelectasis or pneumonia.   Enlarged size and more solid appearance of rounded nodule in the right lower lobe measuring up to 9 mm in diameter, worrisome for neoplasia (series 11, images ). Recommend further evaluation with completion chest CT on a nonemergent basis. A yellow alert was sent.   Severe atherosclerosis, with moderate to severe narrowing of the origins of the celiac axis and SMA and right renal artery, severe narrowing of the origin of the left renal artery and proximal SHI, moderate to severe diffuse narrowing of  bilateral common, external and iliac arteries and distal branches. No aneurysmal dilation is seen.   Nonobstructing left renal calculus incidentally noted.   Large left diaphragmatic hernia again noted with intrathoracic location of stomach, spleen and splenic flexure of the colon, multiple small bowel loops and tail of the pancreas.   Additional findings as discussed above   MACRO: Critical Finding:  See findings. Notification was initiated on 4/5/2025 at 4:11 am by  Valente Cleaning.  (**-YCF-**) Instructions:   Signed by: Valente Cleaning 4/5/2025 4:13 AM Dictation workstation:   [id number]    XR chest 1 view    Result Date: 4/5/2025  1.  New diffuse interstitial opacities may relate to chronic changes or acute pulmonary interstitial edema/CHF. There is consolidative opacity in the right lung apex, suspicious for pneumonia. Underlying malignancy is not excluded. Recommend further evaluation with chest CT. Mild left basilar atelectasis. No sizable pleural effusion. No pneumothorax. Similar asymmetric elevation of the left hemidiaphragm.       MACRO: None   Signed by: Valente Cleaning 4/5/2025 3:36 AM Dictation workstation:   [id number]     Cardiology, Vascular, and Other Imaging  No other imaging results found for the past 2 days       PAST HISTORIES AND ALLERGIES     Past Medical History  He has a past medical history of DM2 (diabetes mellitus, type 2) (Multi).    Surgical History  He has a past surgical history that includes Amputation (Right).     Social History  He reports that he has been smoking cigarettes. He does not have any smokeless tobacco history on file. No history on file for alcohol use and drug use.    Family History  Family History   Problem Relation Name Age of Onset    Diabetes Mother      Diabetes Father         Allergies  Patient has no known allergies.    MEDICATIONS     Scheduled Medications:  azithromycin, 500 mg, intravenous, Once  [START ON 4/6/2025] azithromycin, 500 mg, intravenous, q24h  [START ON  4/6/2025] cefTRIAXone, 1 g, intravenous, q24h  enoxaparin, 40 mg, subcutaneous, q24h  insulin glargine, 25 Units, subcutaneous, Nightly  insulin lispro, 0-15 Units, subcutaneous, TID AC  magnesium sulfate, 2 g, intravenous, Once  pantoprazole, 40 mg, oral, Daily   Or  pantoprazole, 40 mg, intravenous, Daily  potassium chloride CR, 20 mEq, oral, Once  sodium chloride, 1,200 mL, intravenous, Once      Continuous Medications:     PRN Medications:  PRN medications: bisacodyl, bisacodyl, dextrose, dextrose, glucagon, glucagon, guaiFENesin, melatonin, ondansetron **OR** ondansetron     REVIEW OF SYSTEMS     Review of Systems   Constitutional:  Positive for chills and fatigue. Negative for activity change, appetite change, diaphoresis and fever.   HENT:  Negative for congestion, ear pain, rhinorrhea, sinus pain and sore throat.    Respiratory:  Positive for cough. Negative for apnea, chest tightness, shortness of breath, wheezing and stridor.    Cardiovascular:  Negative for chest pain, palpitations and leg swelling.   Gastrointestinal:  Positive for blood in stool. Negative for abdominal distention, abdominal pain, constipation, diarrhea, nausea and vomiting.   Genitourinary:  Negative for difficulty urinating, dysuria, flank pain, frequency, hematuria and urgency.   Musculoskeletal:  Negative for arthralgias, back pain, gait problem, joint swelling and myalgias.   Skin:  Negative for color change, pallor, rash and wound.   Neurological:  Negative for dizziness, syncope, weakness, light-headedness, numbness and headaches.   Psychiatric/Behavioral:  Negative for agitation, behavioral problems, confusion and decreased concentration. The patient is not nervous/anxious.    All other systems reviewed and are negative.      OBJECTIVE     Last Recorded Vitals  /63   Pulse 100   Temp 36.2 °C (97.2 °F) (Temporal)   Resp 20   Wt 73.3 kg (161 lb 9.6 oz)   SpO2 97%      Physical Exam:  Vital signs and nursing notes  reviewed.   Constitutional: Pleasant and cooperative. Laying in bed in mild acute distress. Conversant. Lethargic  Skin: Warm and dry; no obvious lesions, rashes, pallor, or jaundice.   Eyes: EOMI. Anicteric sclera.   ENT: Mucous membranes moist; no obvious injury or deformity appreciated.   Head and Neck: Normocephalic, atraumatic. ROM preserved. Trachea midline. No appreciable JVD.   Respiratory: Nonlabored on NC. Lungs clear to auscultation bilaterally without obvious adventitious sounds. Chest rise is equal.  Cardiovascular: RRR. No gross murmur, gallop, or rub. Extremities are warm and well-perfused with good capillary refill (< 3 seconds). No chest wall tenderness.   GI: Abdomen soft, nontender, nondistended. No obvious organomegaly appreciated. Bowel sounds are present.  : No CVA tenderness.   MSK: No gross abnormalities appreciated. No limitations to AROM/PROM appreciated.   Extremities: No cyanosis, edema, or clubbing evident. Neurovascularly intact. R BKA  Neuro: A&Ox3. CN 2-12 grossly intact. Able to respond to questions appropriately and clearly. No acute focal neurologic deficits appreciated.  Psych: Appropriate mood and behavior.    ASSESSMENT AND PLAN   Assessment/Plan     75 y.o. 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    Plan:  Admit to [facility] with tele.    LLL pneumonia, sepsis:  CT showing L lung basilar pneumonia and in inferior lingula.  Start Rocephin and azithromycin  Sepsis bolus ordered - pt is septic with tachycardia and WBC count 17.8.  Bcx X2 ordered. Lactate 1.6.  Wean O2 as tolerated  Check urine strep pneumo and legionella.    IDDM2:  Continue home Lantus 25 units HS  Start Ssi while inpatient  Check A1c  Accuchecks, hypoglycemic protocol    Colitis vs possible neoplasm, hematochezia:  Ct showing edema and mural thickening of sigmoid colon, possible neoplasm.  Recommend outpatient  colonoscopy after discharge if no other issues arise.  Give lack of GI symptoms will monitor this for now.  Hg 11.4 currently, while his baseline is around 9-10. Will continue to monitor for further hematochezia.  Protonix started    RLL - 9 mm nodule, worrisome for neoplasia - recommend outpatient follow-up for this. CT chest ordered.    L diaphragmatic hernia with intrathoracic location of abdominal organs - likely not a surgical candidate for this type of thoracic surgery. Continue to monitor.    Cigarette nicotine dependence - recommended smoking cessation    Diet: Carb controlled  DVT Prophylaxis: Lovenox   Code Status: Full Code   Case Discussed With: ED provider  Additional Sources Reviewed: ED note day of admission; past admission notes.    Anticipated Length of Stay (LOS): Patient will require two-plus midnight stay for further evaluation and management of the above.      DO Martha Bal dictation software was used to dictate this note and thus there may be minor errors in translation/transcription including garbled speech or misspellings. Please contact for clarification if needed.

## 2025-04-06 VITALS
SYSTOLIC BLOOD PRESSURE: 103 MMHG | HEIGHT: 72 IN | DIASTOLIC BLOOD PRESSURE: 62 MMHG | WEIGHT: 161.6 LBS | RESPIRATION RATE: 15 BRPM | HEART RATE: 73 BPM | OXYGEN SATURATION: 94 % | TEMPERATURE: 98.4 F | BODY MASS INDEX: 21.89 KG/M2

## 2025-04-06 LAB
ANION GAP SERPL CALC-SCNC: 14 MMOL/L (ref 10–20)
BACTERIA BLD AEROBE CULT: ABNORMAL
BACTERIA BLD CULT: ABNORMAL
BACTERIA BLD CULT: NORMAL
BACTERIA UR CULT: NORMAL
BUN SERPL-MCNC: 25 MG/DL (ref 6–23)
CALCIUM SERPL-MCNC: 7.5 MG/DL (ref 8.6–10.3)
CHLORIDE SERPL-SCNC: 101 MMOL/L (ref 98–107)
CO2 SERPL-SCNC: 25 MMOL/L (ref 21–32)
CREAT SERPL-MCNC: 1.43 MG/DL (ref 0.5–1.3)
EGFRCR SERPLBLD CKD-EPI 2021: 51 ML/MIN/1.73M*2
ERYTHROCYTE [DISTWIDTH] IN BLOOD BY AUTOMATED COUNT: 14.2 % (ref 11.5–14.5)
GLUCOSE BLD MANUAL STRIP-MCNC: 152 MG/DL (ref 74–99)
GLUCOSE BLD MANUAL STRIP-MCNC: 167 MG/DL (ref 74–99)
GLUCOSE BLD MANUAL STRIP-MCNC: 66 MG/DL (ref 74–99)
GLUCOSE BLD MANUAL STRIP-MCNC: 93 MG/DL (ref 74–99)
GLUCOSE SERPL-MCNC: 103 MG/DL (ref 74–99)
GRAM STN SPEC: ABNORMAL
GRAM STN SPEC: ABNORMAL
HCT VFR BLD AUTO: 26.9 % (ref 41–52)
HCT VFR BLD AUTO: 30.2 % (ref 41–52)
HGB BLD-MCNC: 8.4 G/DL (ref 13.5–17.5)
HGB BLD-MCNC: 9.4 G/DL (ref 13.5–17.5)
LEGIONELLA AG UR QL: NEGATIVE
MAGNESIUM SERPL-MCNC: 1.77 MG/DL (ref 1.6–2.4)
MCH RBC QN AUTO: 28.9 PG (ref 26–34)
MCHC RBC AUTO-ENTMCNC: 31.2 G/DL (ref 32–36)
MCV RBC AUTO: 92 FL (ref 80–100)
NRBC BLD-RTO: 0 /100 WBCS (ref 0–0)
PLATELET # BLD AUTO: 261 X10*3/UL (ref 150–450)
POTASSIUM SERPL-SCNC: 4.1 MMOL/L (ref 3.5–5.3)
RBC # BLD AUTO: 2.91 X10*6/UL (ref 4.5–5.9)
S PNEUM AG UR QL: NEGATIVE
SODIUM SERPL-SCNC: 136 MMOL/L (ref 136–145)
WBC # BLD AUTO: 14.8 X10*3/UL (ref 4.4–11.3)

## 2025-04-06 PROCEDURE — 80048 BASIC METABOLIC PNL TOTAL CA: CPT | Performed by: STUDENT IN AN ORGANIZED HEALTH CARE EDUCATION/TRAINING PROGRAM

## 2025-04-06 PROCEDURE — 83735 ASSAY OF MAGNESIUM: CPT | Performed by: HOSPITALIST

## 2025-04-06 PROCEDURE — 87040 BLOOD CULTURE FOR BACTERIA: CPT | Mod: PORLAB | Performed by: STUDENT IN AN ORGANIZED HEALTH CARE EDUCATION/TRAINING PROGRAM

## 2025-04-06 PROCEDURE — 1200000002 HC GENERAL ROOM WITH TELEMETRY DAILY

## 2025-04-06 PROCEDURE — 2500000001 HC RX 250 WO HCPCS SELF ADMINISTERED DRUGS (ALT 637 FOR MEDICARE OP): Performed by: STUDENT IN AN ORGANIZED HEALTH CARE EDUCATION/TRAINING PROGRAM

## 2025-04-06 PROCEDURE — 2500000002 HC RX 250 W HCPCS SELF ADMINISTERED DRUGS (ALT 637 FOR MEDICARE OP, ALT 636 FOR OP/ED): Performed by: STUDENT IN AN ORGANIZED HEALTH CARE EDUCATION/TRAINING PROGRAM

## 2025-04-06 PROCEDURE — 2500000004 HC RX 250 GENERAL PHARMACY W/ HCPCS (ALT 636 FOR OP/ED): Performed by: HOSPITALIST

## 2025-04-06 PROCEDURE — 36415 COLL VENOUS BLD VENIPUNCTURE: CPT | Performed by: STUDENT IN AN ORGANIZED HEALTH CARE EDUCATION/TRAINING PROGRAM

## 2025-04-06 PROCEDURE — 82947 ASSAY GLUCOSE BLOOD QUANT: CPT

## 2025-04-06 PROCEDURE — 85014 HEMATOCRIT: CPT | Performed by: HOSPITALIST

## 2025-04-06 PROCEDURE — 85027 COMPLETE CBC AUTOMATED: CPT | Performed by: STUDENT IN AN ORGANIZED HEALTH CARE EDUCATION/TRAINING PROGRAM

## 2025-04-06 PROCEDURE — 2500000004 HC RX 250 GENERAL PHARMACY W/ HCPCS (ALT 636 FOR OP/ED): Performed by: STUDENT IN AN ORGANIZED HEALTH CARE EDUCATION/TRAINING PROGRAM

## 2025-04-06 PROCEDURE — 99232 SBSQ HOSP IP/OBS MODERATE 35: CPT | Performed by: HOSPITALIST

## 2025-04-06 RX ORDER — SODIUM CHLORIDE, SODIUM LACTATE, POTASSIUM CHLORIDE, CALCIUM CHLORIDE 600; 310; 30; 20 MG/100ML; MG/100ML; MG/100ML; MG/100ML
75 INJECTION, SOLUTION INTRAVENOUS CONTINUOUS
Status: ACTIVE | OUTPATIENT
Start: 2025-04-06 | End: 2025-04-07

## 2025-04-06 RX ADMIN — SODIUM CHLORIDE, POTASSIUM CHLORIDE, SODIUM LACTATE AND CALCIUM CHLORIDE 75 ML/HR: 600; 310; 30; 20 INJECTION, SOLUTION INTRAVENOUS at 16:05

## 2025-04-06 RX ADMIN — PANTOPRAZOLE SODIUM 40 MG: 40 TABLET, DELAYED RELEASE ORAL at 08:33

## 2025-04-06 RX ADMIN — CEFTRIAXONE 1 G: 1 INJECTION, SOLUTION INTRAVENOUS at 05:41

## 2025-04-06 RX ADMIN — AZITHROMYCIN DIHYDRATE 500 MG: 500 INJECTION, POWDER, LYOPHILIZED, FOR SOLUTION INTRAVENOUS at 12:38

## 2025-04-06 RX ADMIN — INSULIN GLARGINE 25 UNITS: 100 INJECTION, SOLUTION SUBCUTANEOUS at 21:25

## 2025-04-06 RX ADMIN — ENOXAPARIN SODIUM 40 MG: 40 INJECTION SUBCUTANEOUS at 08:33

## 2025-04-06 ASSESSMENT — COGNITIVE AND FUNCTIONAL STATUS - GENERAL
MOVING FROM LYING ON BACK TO SITTING ON SIDE OF FLAT BED WITH BEDRAILS: A LITTLE
MOVING TO AND FROM BED TO CHAIR: A LOT
DRESSING REGULAR LOWER BODY CLOTHING: A LITTLE
WALKING IN HOSPITAL ROOM: A LITTLE
PERSONAL GROOMING: A LITTLE
DRESSING REGULAR UPPER BODY CLOTHING: A LITTLE
DAILY ACTIVITIY SCORE: 17
EATING MEALS: A LITTLE
HELP NEEDED FOR BATHING: A LITTLE
CLIMB 3 TO 5 STEPS WITH RAILING: A LOT
TOILETING: A LOT
MOBILITY SCORE: 15
TURNING FROM BACK TO SIDE WHILE IN FLAT BAD: A LITTLE
STANDING UP FROM CHAIR USING ARMS: A LOT

## 2025-04-06 NOTE — CONSULTS
"         Infectious Disease Inpatient Consult    Consults  Referred by Dr Colin Brumfield MD: GENERIC EXTERNAL DATA PROVIDER    Reason For Consult  Pneumonia    History Of Present Illness  Nancy Vogt is a 75 y.o. 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 Pt has been feeling generally unwell for the past few days. He has had some chills and a dry cough. No shortness of breath, fevers, nausea, vomiting, chest pain, or abdominal pain. He says his family members have \"always been getting sick\" around him. He continues to smoke 1 ppd and has smoked for decades. He denies alcohol or illicit drug use. While in the ER he was noted to have a bloody bowel movement with bright red hematochezia. No known hemorrhoids. Pt has never had a colonoscopy per him. Pt's only other main medical issue is ID-DM2 and pt says he is compliant with his insulin.      Past Medical History  He has a past medical history of DM2 (diabetes mellitus, type 2) (Multi).    Surgical History  He has a past surgical history that includes Amputation (Right).     Social History     Occupational History    Not on file   Tobacco Use    Smoking status: Every Day     Current packs/day: 2.00     Types: Cigarettes    Smokeless tobacco: Never   Vaping Use    Vaping status: Never Used   Substance and Sexual Activity    Alcohol use: Not Currently    Drug use: Never    Sexual activity: Not on file     Travel History   Travel since 03/06/25    No documented travel since 03/06/25          Family History  Family History   Problem Relation Name Age of Onset    Diabetes Mother      Diabetes Father       Allergies  Patient has no known allergies.     Immunization History   Administered Date(s) Administered    Flu vaccine (IIV4), preservative free *Check age/dose* 10/04/2019    Flu vaccine, quadrivalent, high-dose, preservative free, age 65y+ (FLUZONE) " 09/29/2020, 11/23/2022    Flu vaccine, quadrivalent, no egg protein, age 6 month or greater (FLUCELVAX) 11/16/2021    Influenza, seasonal, injectable 11/09/2001, 10/23/2014    Influenza, trivalent, adjuvanted 12/28/2018    Giancarlo SARS-CoV-2 Vaccination 11/16/2021    Pneumococcal conjugate vaccine, 13-valent (PREVNAR 13) 11/16/2021     Medications  Home medications:  Medications Prior to Admission   Medication Sig Dispense Refill Last Dose/Taking    metFORMIN (Glucophage) 1,000 mg tablet Take 1 tablet (1,000 mg) by mouth once daily.   Past Week    aspirin 81 mg EC tablet Take 1 tablet (81 mg) by mouth once daily as needed for mild pain (1 - 3).       insulin asp prt-insulin aspart (NovoLOG Mix 70-30) 100 unit/mL (70-30) injection Inject 20 Units under the skin 2 times a day with meals. (Patient not taking: Reported on 4/6/2025) 15 mL 4 Not Taking    insulin glargine (Lantus Solostar U-100 Insulin) 100 unit/mL (3 mL) pen INJECT 25 UNITS UNDER THE SKIN AT BEDTIME 10 each 5      Current medications:  Scheduled medications  azithromycin, 500 mg, intravenous, q24h  cefTRIAXone, 1 g, intravenous, q24h  enoxaparin, 40 mg, subcutaneous, q24h  insulin glargine, 25 Units, subcutaneous, Nightly  insulin lispro, 0-15 Units, subcutaneous, TID AC  pantoprazole, 40 mg, oral, Daily   Or  pantoprazole, 40 mg, intravenous, Daily      Continuous medications     PRN medications  PRN medications: bisacodyl, bisacodyl, dextrose, dextrose, glucagon, glucagon, guaiFENesin, melatonin, ondansetron **OR** ondansetron    Review of Systems     Objective  Range of Vitals (last 24 hours)  Heart Rate:  [75-83]   Temp:  [35.9 °C (96.6 °F)-37 °C (98.6 °F)]   Resp:  [16-18]   BP: ()/(55-62)   SpO2:  [90 %-99 %]   Daily Weight  04/05/25 : 73.3 kg (161 lb 9.6 oz)    Body mass index is 21.92 kg/m².     Physical Exam  BP 92/55 (BP Location: Left arm, Patient Position: Lying) Comment: RN notified  Pulse 78   Temp 36.2 °C (97.2 °F) (Temporal)    "Resp 16   Ht 1.829 m (6')   Wt 73.3 kg (161 lb 9.6 oz)   SpO2 90%   BMI 21.92 kg/m²   Temp (24hrs), Av.4 °C (97.5 °F), Min:35.9 °C (96.6 °F), Max:37 °C (98.6 °F)      General: alert, oriented, NAD  Lungs: bilaterally clear to auscultation  Heart: regular rate and rhythm  Abdomen: soft, non tender, non distended, BS+  Extremities: no edema  No rashes  No joint inflammation  Neck supple  Lines ok  No CVAT     Relevant Results    Labs  Results from last 72 hours   Lab Units 25  0545 25  0151   WBC AUTO x10*3/uL 14.8* 17.8*   HEMOGLOBIN g/dL 8.4* 11.4*   HEMATOCRIT % 26.9* 36.6*   PLATELETS AUTO x10*3/uL 261 393   NEUTROS PCT AUTO %  --  95.6   LYMPHS PCT AUTO %  --  2.0   MONOS PCT AUTO %  --  1.6   EOS PCT AUTO %  --  0.1     Results from last 72 hours   Lab Units 25  0545 25  0151   SODIUM mmol/L 136 139   POTASSIUM mmol/L 4.1 3.4*   CHLORIDE mmol/L 101 104   CO2 mmol/L 25 26   BUN mg/dL 25* 20   CREATININE mg/dL 1.43* 1.35*   GLUCOSE mg/dL 103* 90   CALCIUM mg/dL 7.5* 8.6   ANION GAP mmol/L 14 12   EGFR mL/min/1.73m*2 51* 55*     Results from last 72 hours   Lab Units 25  0151   ALK PHOS U/L 84   BILIRUBIN TOTAL mg/dL 0.5   PROTEIN TOTAL g/dL 7.5   ALT U/L 9*   AST U/L 19   ALBUMIN g/dL 3.4     Estimated Creatinine Clearance: 46.3 mL/min (A) (by C-G formula based on SCr of 1.43 mg/dL (H)).  CRP   Date Value Ref Range Status   2022 13.24 (A) mg/dL Final     Comment:     REF VALUE  < 1.00       Sedimentation Rate   Date Value Ref Range Status   2023 60 (H) 0 - 20 mm/h Final   2022 103 (H) 0 - 20 mm/h Final     No results found for: \"HIV1X2\", \"HIVCONF\", \"SOYXCE8NJ\"  No results found for: \"HEPCABINIT\", \"HEPCAB\", \"HCVPCRQUANT\"  Microbiology  Susceptibility data from last 100 days.  Collected Specimen Info Organism   25 Blood culture from Peripheral Venipuncture Staphylococcus epidermidis           No lab exists for component: \"AGALPCRNB\"                    "     Imaging  Imaging  CT chest wo IV contrast    Result Date: 4/5/2025  Patchy consolidative opacities in the right upper lobe, left lower lobe, and to a lesser degree the right lower lobe. These are nonspecific but may relate to pneumonia. After treatment, follow-up recommended to document resolution. 10 mm right lower lobe nodule which appears to have increased in size when compared to a previous CT abdomen pelvis of 02/03/2011 when it measured approximately 5-6 mm. PET scan may be useful to assess for hypermetabolic activity as slow growing neoplasm is not excluded.   MACRO: None.   Signed by: Hector Saavedra 4/5/2025 7:23 AM Dictation workstation:   [id number]    CT angio abdomen pelvis w and or wo IV IV contrast    Result Date: 4/5/2025  There is submucosal edema and mural thickening in the colon, most pronounced involving the sigmoid colon, compatible with colitis. Underlying neoplasm is not excluded and correlation with endoscopy is recommended following resolution of patient's acute illness seen without recently performed to exclude colorectal carcinoma. Colonic diverticula are present without evidence of acute diverticulitis. No abnormal enhancement is seen within the GI tract to suggest an active site of bleeding at this time.   Consolidation posteriorly in the left lung base, compatible with pneumonia. Patchy consolidative opacity in the inferior lingula also noted, which may relate to atelectasis or pneumonia.   Enlarged size and more solid appearance of rounded nodule in the right lower lobe measuring up to 9 mm in diameter, worrisome for neoplasia (series 11, images ). Recommend further evaluation with completion chest CT on a nonemergent basis. A yellow alert was sent.   Severe atherosclerosis, with moderate to severe narrowing of the origins of the celiac axis and SMA and right renal artery, severe narrowing of the origin of the left renal artery and proximal SHI, moderate to severe diffuse  narrowing of bilateral common, external and iliac arteries and distal branches. No aneurysmal dilation is seen.   Nonobstructing left renal calculus incidentally noted.   Large left diaphragmatic hernia again noted with intrathoracic location of stomach, spleen and splenic flexure of the colon, multiple small bowel loops and tail of the pancreas.   Additional findings as discussed above   MACRO: Critical Finding:  See findings. Notification was initiated on 4/5/2025 at 4:11 am by  Valente Cleaning.  (**-YCF-**) Instructions:   Signed by: Valente Cleaning 4/5/2025 4:13 AM Dictation workstation:   [id number]    XR chest 1 view    Result Date: 4/5/2025  1.  New diffuse interstitial opacities may relate to chronic changes or acute pulmonary interstitial edema/CHF. There is consolidative opacity in the right lung apex, suspicious for pneumonia. Underlying malignancy is not excluded. Recommend further evaluation with chest CT. Mild left basilar atelectasis. No sizable pleural effusion. No pneumothorax. Similar asymmetric elevation of the left hemidiaphragm.       MACRO: None   Signed by: Valente Cleaning 4/5/2025 3:36 AM Dictation workstation:   [id number]     Cardiology, Vascular, and Other Imaging  No other imaging results found for the past 7 days      Echo  No results found for this or any previous visit.    Assessment   LLL Pneumonia  Sepsis  Positive Bcx, one of 2, probable contaminant  DM2  Colitis w GI Bleed  RLL Nodule worrisome for neoplastic process    Plan   CT showing L lung basilar pneumonia and in inferior lingula.  Continue Rocephin and azithromycin  Sepsis bolus ordered - pt is septic with tachycardia and WBC count 17.8.  Bcx X2 ordered. Lactate 1.6.  Wean O2 as tolerated  Check urine strep pneumo and legionella.    I spent 32 minutes in the professional and overall care of this patient.      Richard Alexander MD

## 2025-04-06 NOTE — CARE PLAN
The clinical goals for the shift include Pt spo2 will remain 92% or above on supplemental o2 overnight      Problem: Skin  Goal: Decreased wound size/increased tissue granulation at next dressing change  Outcome: Progressing  Goal: Participates in plan/prevention/treatment measures  Outcome: Progressing  Goal: Prevent/manage excess moisture  Outcome: Progressing  Goal: Prevent/minimize sheer/friction injuries  Outcome: Progressing  Flowsheets (Taken 4/6/2025 0325)  Prevent/minimize sheer/friction injuries: Use pull sheet  Goal: Promote/optimize nutrition  Outcome: Progressing  Flowsheets (Taken 4/6/2025 0325)  Promote/optimize nutrition:   Offer water/supplements/favorite foods   Consume > 50% meals/supplements   Monitor/record intake including meals  Goal: Promote skin healing  Outcome: Progressing  Flowsheets (Taken 4/6/2025 0325)  Promote skin healing:   Turn/reposition every 2 hours/use positioning/transfer devices   Protective dressings over bony prominences     Problem: Diabetes  Goal: Achieve decreasing blood glucose levels by end of shift  Outcome: Progressing  Goal: Increase stability of blood glucose readings by end of shift  Outcome: Progressing  Goal: Decrease in ketones present in urine by end of shift  Outcome: Progressing  Goal: Maintain electrolyte levels within acceptable range throughout shift  Outcome: Progressing  Goal: Maintain glucose levels >70mg/dl to <250mg/dl throughout shift  Outcome: Progressing  Goal: No changes in neurological exam by end of shift  Outcome: Progressing  Goal: Learn about and adhere to nutrition recommendations by end of shift  Outcome: Progressing  Goal: Vital signs within normal range for age by end of shift  Outcome: Progressing  Goal: Increase self care and/or family involovement by end of shift  Outcome: Progressing  Goal: Receive DSME education by end of shift  Outcome: Progressing     Problem: Pain - Adult  Goal: Verbalizes/displays adequate comfort level or  baseline comfort level  Outcome: Progressing     Problem: Safety - Adult  Goal: Free from fall injury  Outcome: Progressing     Problem: Discharge Planning  Goal: Discharge to home or other facility with appropriate resources  Outcome: Progressing     Problem: Chronic Conditions and Co-morbidities  Goal: Patient's chronic conditions and co-morbidity symptoms are monitored and maintained or improved  Outcome: Progressing     Problem: Nutrition  Goal: Nutrient intake appropriate for maintaining nutritional needs  Outcome: Progressing     Problem: Fall/Injury  Goal: Not fall by end of shift  Outcome: Progressing  Goal: Be free from injury by end of the shift  Outcome: Progressing  Goal: Verbalize understanding of personal risk factors for fall in the hospital  Outcome: Progressing  Goal: Verbalize understanding of risk factor reduction measures to prevent injury from fall in the home  Outcome: Progressing  Goal: Use assistive devices by end of the shift  Outcome: Progressing  Goal: Pace activities to prevent fatigue by end of the shift  Outcome: Progressing

## 2025-04-06 NOTE — PROGRESS NOTES
Nancy Vogt is a 75 y.o. male admitted for Pneumonia due to organism. Pharmacy reviewed the patient's [id number] medications and allergies for accuracy.    The list below reflects the PTA list prior to pharmacy medication history. A summary a changes to the PTA medication list has been listed below. Please review each medication in order reconciliation for additional clarification and justification.    Source of information: t2p    Medications added:    Medications modified:    Medications to be removed:  Tylenol 325mg   Regranex gel  Novolog mix insulin   Protonix 40mg    Medications of concern:      Prior to Admission Medications   Prescriptions Last Dose Informant Patient Reported? Taking?   acetaminophen (Tylenol) 325 mg tablet More than a month  Yes No   Sig: Take 2 tablets (650 mg) by mouth every 4 hours if needed for mild pain (1 - 3).   Patient not taking: Reported on 4/5/2025   aspirin 81 mg EC tablet   Yes No   Sig: Take 1 tablet (81 mg) by mouth once daily as needed for mild pain (1 - 3).   becaplermin (Regranex) 0.01 % gel 4/2/2025  Yes No   Sig: use to ulcer qd as directed   insulin asp prt-insulin aspart (NovoLOG Mix 70-30) 100 unit/mL (70-30) injection   No No   Sig: Inject 20 Units under the skin 2 times a day with meals.   insulin glargine (Lantus Solostar U-100 Insulin) 100 unit/mL (3 mL) pen   No No   Sig: INJECT 25 UNITS UNDER THE SKIN AT BEDTIME   metFORMIN (Glucophage) 1,000 mg tablet Past Week  Yes Yes   Sig: Take 1 tablet (1,000 mg) by mouth once daily.   pantoprazole (ProtoNix) 40 mg EC tablet   Yes No   Sig: Take 1 tablet (40 mg) by mouth once daily.      Facility-Administered Medications: None       ANDREA SHORT

## 2025-04-06 NOTE — PROGRESS NOTES
"Nancy Vogt [id number]   Service: Internal Medicine / Hospitalist Date of service: 4/6/2025                                  Full Code                           Subjective           History Of Present Illness (HPI):  Nancy Vogt is a 75 y.o. 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 Pt has been feeling generally unwell for the past few days. He has had some chills and a dry cough. No shortness of breath, fevers, nausea, vomiting, chest pain, or abdominal pain. He says his family members have \"always been getting sick\" around him. He continues to smoke 1 ppd and has smoked for decades. He denies alcohol or illicit drug use. While in the ER he was noted to have a bloody bowel movement with bright red hematochezia. No known hemorrhoids. Pt has never had a colonoscopy per him. Pt's only other main medical issue is ID-DM2 and pt says he is compliant with his insulin.     ED Course:   Vitals on presentation: T 36.2 °C (97.2 °F)  HR (!) 111 -> 100 bpm  /67  RR 18  O2 95 % None (Room air)  Labs:   CBC with WBC 17.8, Hgb 11.4, Plts 393.   CMP with glucose 90, Na 139, K 3.4, BUN 20, sCr 1.35, alk phos 84, ALT 9, AST 19, bilirubin 0.5. Magnesium 1.41.   Lactate 1.6  EKG: sinus tach at 110 bpm, no ST changes  Imaging - CXR - 1.  New diffuse interstitial opacities may relate to chronic changes   or acute pulmonary interstitial edema/CHF. There is consolidative   opacity in the right lung apex, suspicious for pneumonia. Underlying   malignancy is not excluded. Recommend further evaluation with chest   CT. Mild left basilar atelectasis. No sizable pleural effusion. No   pneumothorax. Similar asymmetric elevation of the left hemidiaphragm.   CTA a/p w/wout contrast - There is submucosal edema and mural thickening in the colon, most   pronounced involving the sigmoid colon, compatible with colitis.   Underlying neoplasm is not " excluded and correlation with endoscopy is   recommended following resolution of patient's acute illness seen   without recently performed to exclude colorectal carcinoma. Colonic   diverticula are present without evidence of acute diverticulitis. No   abnormal enhancement is seen within the GI tract to suggest an active   site of bleeding at this time.       Consolidation posteriorly in the left lung base, compatible with   pneumonia. Patchy consolidative opacity in the inferior lingula also   noted, which may relate to atelectasis or pneumonia.       Enlarged size and more solid appearance of rounded nodule in the   right lower lobe measuring up to 9 mm in diameter, worrisome for   neoplasia (series 11, images ). Recommend further evaluation   with completion chest CT on a nonemergent basis. A yellow alert was   sent.      Severe atherosclerosis, with moderate to severe narrowing of the   origins of the celiac axis and SMA and right renal artery, severe   narrowing of the origin of the left renal artery and proximal SHI,   moderate to severe diffuse narrowing of bilateral common, external   and iliac arteries and distal branches. No aneurysmal dilation is   seen.   Nonobstructing left renal calculus incidentally noted.   Large left diaphragmatic hernia again noted with intrathoracic   location of stomach, spleen and splenic flexure of the colon,   multiple small bowel loops and tail of the pancreas.   agree with radiology interpretation(s):   Interventions: Rocephin and azithromycin started, NS 1 L bolus, Pt admitted for further care.     4/5...........................Patient reported overall  he felt a bit better today.  No reported: Headaches, chest pains, nausea, vomiting, fevers or chills.    4/6........................No reported: Dyspnea at rest, fevers, chills ,cough, chest pains, nausea, vomiting or palpitations.           Review of Systems:   Review of system otherwise negative if not aforementioned  above in subjective.     Objective          Physical Exam      Constitutional:       Appearance: Patient appeared in no acute cardiopulmonary distress.     Comments: Patient alert and oriented to :person ,place ,time, and situation.The patient appeared clinically improving nontoxic.  HEENT:      Head: Normocephalic and atraumatic.Trachea midline      Nose:No observed congestion or rhinorrhea.     Mouth/Throat: Mucous membranes Moist, Trachea appeared  midline.  Eyes:      Extraocular Movements: Extraocular movements intact.      Pupils: Pupils are equal, round, and reactive to light.      Comments: No scleral icterus or conjunctival injection appreciated.   Cardiovascular:      Rate and Rhythm: Normal rate and regular rhythm. No clicks rubs or gallops, normal S1 and S2.No peripheral stigmata of endocarditis appreciated.     Pulmonary:      Diminished lung bases but no adventitious sound appreciated.  Abdominal:      General: Abdomen soft, nontender, active bowel sounds, no involuntary guarding or rebound tenderness appreciated.     Comments: None   Musculoskeletal:      Comment: Right below the knee amputation.   No pitting edema or cyanosis appreciated.       Skin:     General: Skin is warm.      Coloration:  No jaundice     Findings: No abnormal appearing skin rashes or lesions that appeared acute noted on unclothed area of the skin..   Neurological:      General: No focal sensory or motor deficits appreciated, no meningeal signs or dysmetria noted.      Cranial Nerves: Cranial nerves II to XII appearing grossly intact.     Genitals:  Deferred  Psychiatric:         The patient appears to be displaying normal mood and affect at the time of evaluation.     Labs:           Lab Results   Component Value Date     GLUCOSE 90 04/05/2025     CALCIUM 8.6 04/05/2025      04/05/2025     K 3.4 (L) 04/05/2025     CO2 26 04/05/2025      04/05/2025     BUN 20 04/05/2025     CREATININE 1.35 (H) 04/05/2025       Lab  Results   Component Value Date    GLUCOSE 103 (H) 04/06/2025    CALCIUM 7.5 (L) 04/06/2025     04/06/2025    K 4.1 04/06/2025    CO2 25 04/06/2025     04/06/2025    BUN 25 (H) 04/06/2025    CREATININE 1.43 (H) 04/06/2025         Lab Results   Component Value Date     WBC 17.8 (H) 04/05/2025     HGB 11.4 (L) 04/05/2025     HCT 36.6 (L) 04/05/2025     MCV 92 04/05/2025      04/05/2025      Lab Results   Component Value Date    WBC 14.8 (H) 04/06/2025    HGB 8.4 (L) 04/06/2025    HCT 26.9 (L) 04/06/2025    MCV 92 04/06/2025     04/06/2025      [unfilled]   [unfilled]   No results found for the last 90 days.                  X-rays/ Images     [unfilled]   Radiology Results (last 21 days)     Procedure Component Value Units Date/Time   CT chest wo IV contrast [[id number]] Collected: 04/05/25 0725   Order Status: Completed Updated: 04/05/25 0725   Narrative:     Interpreted By:  Hector Saavedra,  STUDY:  CT CHEST WO IV CONTRAST;  4/5/2025 6:24 am      INDICATION:  Signs/Symptoms:readiology recomendation, PNA.          COMPARISON:  None.      ACCESSION NUMBER(S):  [id number]      ORDERING CLINICIAN:  GENNY TONEY      TECHNIQUE:  Helical data acquisition of the chest was obtained without the use of  IV contrast. Images were reformatted in axial, coronal, and sagittal  planes.      FINDINGS:  POTENTIAL LIMITATIONS OF THE STUDY:   Lack of IV contrast      HEART AND VESSELS:      There are atherosclerotic calcifications of the aorta and its  branches. Aorta is normal in course and caliber.      The heart is not significantly enlarged.      No pericardial effusion is seen.      MEDIASTINUM AND MIKHAIL, LOWER NECK AND AXILLA:  The visualized thyroid gland is within normal limits.      There are numerous small hilar and mediastinal lymph nodes which are  not pathologically enlarged by size criteria, some of which are  calcified. No axillary lymphadenopathy.      Esophagus appears within normal limits  as seen.      LUNGS AND AIRWAYS:  The trachea and central airways are patent. No endobronchial lesion.  Mild secretions and/or aspiration within the trachea and a few more  peripheral airways.      Patchy consolidative opacities noted in the right upper lobe, left  lower lobe, as lesser degree the right lower lobe. The findings are  nonspecific may relate to pneumonia but follow-up is recommended.  There is a nodule within the right lower lobe which measures 1.0 cm  in greatest dimension, image 281 of 380. This was visible on a  previous CT abdomen pelvis of 02/03/2011 but only measured  approximately 5-6 mm at that time. PET scan may be useful to assess  for hypermetabolic activity. Emphysematous changes, most severe in  the upper lobes. No sizable effusion. No evidence of a pneumothorax.  Elevated left hemidiaphragm.      UPPER ABDOMEN:  Described on a separate CT abdomen and pelvis report.      CHEST WALL AND OSSEOUS STRUCTURES:  No acute process. Degenerative changes.       Impression:     Patchy consolidative opacities in the right upper lobe, left lower  lobe, and to a lesser degree the right lower lobe. These are  nonspecific but may relate to pneumonia. After treatment, follow-up  recommended to document resolution.  10 mm right lower lobe nodule which appears to have increased in size  when compared to a previous CT abdomen pelvis of 02/03/2011 when it  measured approximately 5-6 mm. PET scan may be useful to assess for  hypermetabolic activity as slow growing neoplasm is not excluded.      MACRO:  None.      Signed by: Hector Saavedra 4/5/2025 7:23 AM  Dictation workstation:   [id number]   CT angio abdomen pelvis w and or wo IV IV contrast [[id number]] Collected: 04/05/25 0414   Order Status: Completed Updated: 04/05/25 0414   Narrative:     Interpreted By:  Valente Cleaning,  STUDY:  CT ANGIO ABDOMEN PELVIS W AND/OR WO IV IV CONTRAST; ;  4/5/2025 3:35  am      INDICATION:  Signs/Symptoms:nausea, blood bowel  movement.          COMPARISON:  02/03/2011.      ACCESSION NUMBER(S):  [id number]      ORDERING CLINICIAN:  GENNY TONEY      TECHNIQUE:  Noncontrast CT of the abdomen and pelvis was followed by CT  angiography of the abdomen and pelvis after IV administration of 75  cc Omnipaque 350. Multiplanar, MIP and 3D reformations.      FINDINGS:      LOWER CHEST: Consolidation posteriorly in the left lung base,  compatible with pneumonia. Patchy consolidative opacity in the  inferior lingula also noted, which may relate to atelectasis or  pneumonia. Enlarged size and more solid appearance of rounded nodule  in the right lower lobe measuring up to 9 mm in diameter, worrisome  for neoplasia (series 11, images ). Additional patchy  subpleural small opacities in the right lower lung probably relate to  atelectasis or scarring. Severe emphysema noted. Normal heart size.  Mitral annular and coronary artery calcifications. Large left  diaphragmatic hernia again noted with intrathoracic location of  stomach, spleen and splenic flexure of the colon, multiple small  bowel loops and tail of the pancreas. BONES: No acute osseous  abnormality. Moderate to severe degenerative changes of the imaged  spine, worst at L5-S1 level ABDOMINAL WALL: Within normal limits.      ABDOMEN:      LIVER: Normal in size. No focal lesion is seen.  BILE DUCTS: Normal caliber.  GALLBLADDER: No calcified gallstones. No wall thickening.  PANCREAS: Within normal limits.  SPLEEN: Within normal limits.  ADRENALS: Within normal limits.  KIDNEYS and URETERS: 5 mm nonobstructing calculus in the left upper  pole. Incidental small left lower pole cortical cyst. Otherwise,  normal.          VESSELS: Severe atherosclerosis, with moderate to severe narrowing of  the origins of the celiac axis and SMA and right renal artery, severe  narrowing of the origin of the left renal artery and proximal SHI,  moderate to severe diffuse narrowing of bilateral common,  external  and iliac arteries and distal branches. No aneurysmal dilation is  seen. RETROPERITONEUM: No pathologically enlarged retroperitoneal  lymph nodes.      PELVIS:      REPRODUCTIVE ORGANS: Prostate is not enlarged.  BLADDER: Trabeculated bladder wall suggests sequelae of chronic  bladder outlet obstruction.      BOWEL: Stomach and small bowel appear within normal limits. There is  submucosal edema and mural thickening in the colon, most pronounced  involving the sigmoid colon, compatible with colitis. Underlying  neoplasm is not excluded and correlation with endoscopy is  recommended following resolution of patient's acute illness seen  without recently performed to exclude colorectal carcinoma. Colonic  diverticula are present without evidence of acute diverticulitis. No  abnormal enhancement is seen within the GI tract to suggest an active  site of bleeding at this time.  Normal appendix. PERITONEUM: No  ascites or free air, no fluid collection.       Impression:     There is submucosal edema and mural thickening in the colon, most  pronounced involving the sigmoid colon, compatible with colitis.  Underlying neoplasm is not excluded and correlation with endoscopy is  recommended following resolution of patient's acute illness seen  without recently performed to exclude colorectal carcinoma. Colonic  diverticula are present without evidence of acute diverticulitis. No  abnormal enhancement is seen within the GI tract to suggest an active  site of bleeding at this time.      Consolidation posteriorly in the left lung base, compatible with  pneumonia. Patchy consolidative opacity in the inferior lingula also  noted, which may relate to atelectasis or pneumonia.      Enlarged size and more solid appearance of rounded nodule in the  right lower lobe measuring up to 9 mm in diameter, worrisome for  neoplasia (series 11, images ). Recommend further evaluation  with completion chest CT on a nonemergent basis.  A yellow alert was  sent.      Severe atherosclerosis, with moderate to severe narrowing of the  origins of the celiac axis and SMA and right renal artery, severe  narrowing of the origin of the left renal artery and proximal SHI,  moderate to severe diffuse narrowing of bilateral common, external  and iliac arteries and distal branches. No aneurysmal dilation is  seen.      Nonobstructing left renal calculus incidentally noted.      Large left diaphragmatic hernia again noted with intrathoracic  location of stomach, spleen and splenic flexure of the colon,  multiple small bowel loops and tail of the pancreas.      Additional findings as discussed above      MACRO:  Critical Finding:  See findings. Notification was initiated on  4/5/2025 at 4:11 am by  Valente Cleaning.  (**-YCF-**) Instructions:      Signed by: Valente Cleaning 4/5/2025 4:13 AM  Dictation workstation:   [id number]   XR chest 1 view [[id number]] Collected: 04/05/25 0337   Order Status: Completed Updated: 04/05/25 0337   Narrative:     Interpreted By:  Valente Cleaning,  STUDY:  XR CHEST 1 VIEW;  4/5/2025 3:13 am      INDICATION:  Signs/Symptoms:weakness, leukocytosis.          COMPARISON:  Chest radiography of 03/05/2013.      ACCESSION NUMBER(S):  [id number]      ORDERING CLINICIAN:  GENNY TONEY      FINDINGS:  AP radiograph of the chest was provided.              CARDIOMEDIASTINAL SILHOUETTE:  Cardiomediastinal silhouette is normal in size and configuration.  Calcific atherosclerosis of thoracic aorta. Calcified left hilar  lymph nodes again seen, in keeping with sequelae of chronic  granulomatous disease.      LUNGS:  New diffuse interstitial opacities may relate to chronic changes or  acute pulmonary interstitial edema/CHF. There is consolidative  opacity in the right lung apex, suspicious for pneumonia. Underlying  malignancy is not excluded. Recommend further evaluation with chest  CT. Mild left basilar atelectasis. No sizable pleural effusion.  No  pneumothorax. Similar asymmetric elevation of the left hemidiaphragm.      ABDOMEN:  No remarkable upper abdominal findings.      BONES:  No acute osseous changes.       Impression:     1.  New diffuse interstitial opacities may relate to chronic changes  or acute pulmonary interstitial edema/CHF. There is consolidative  opacity in the right lung apex, suspicious for pneumonia. Underlying  malignancy is not excluded. Recommend further evaluation with chest  CT. Mild left basilar atelectasis. No sizable pleural effusion. No  pneumothorax. Similar asymmetric elevation of the left hemidiaphragm.              MACRO:  None      Signed by: Valente Cleaning 4/5/2025 3:36 AM  Dictation workstation:   [id number]                  Medical Problems         Problem List         * (Principal) Pneumonia due to organism     Long term (current) use of insulin (Multi)     Type 2 diabetes mellitus     Tobacco abuse     Sepsis (Multi)     Cigarette nicotine dependence without complication     Colitis                  Above medical problems may be reflective of historical medical problems that may have resolved and may not related to acute clinical condition/medical problems.     Clinical impression/plan:           75 y.o. 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        LLL pneumonia, sepsis:  CT showing L lung basilar pneumonia and in inferior lingula.  Start Rocephin and azithromycin  Sepsis bolus ordered - pt is septic with tachycardia and WBC count 17.8.  Bcx X2 ordered. Lactate 1.6.  Wean O2 as tolerated  Check urine strep pneumo and legionella.    Bacteremia versus contamination  -Blood culture with surveillance (  one bottle positive)  -Consult ID     IDDM2:  Continue home Lantus 25 units HS  Start Ssi while inpatient  Check A1c  Accuchecks, hypoglycemic protocol     Colitis vs possible neoplasm, hematochezia:  Ct showing edema and  mural thickening of sigmoid colon, possible neoplasm.  Recommend outpatient colonoscopy after discharge if no other issues arise.  Give lack of GI symptoms will monitor this for now.  Hg 11.4 currently, while his baseline is around 9-10. Will continue to monitor for further hematochezia.  Protonix started     RLL - 9 mm nodule, worrisome for neoplasia - recommend outpatient follow-up for this. CT chest ordered.     L diaphragmatic hernia with intrathoracic location of abdominal organs - likely not a surgical candidate for this type of thoracic surgery. Continue to monitor.     Cigarette nicotine dependence - recommended smoking cessation                                                                                                       Disposition/additional care plan/interventions: 4/5/2025     Hypokalemia.................. replete potassium        Continue empirical antibiotics     Blood cultures in progress     Legionella and Streptococcus urine antigen in process.     Radiographic abnormalities, possible neoplasm/CT showing edema and mural thickening of the sigmoid colon, right lower lobe 9 mm nodule worrisome for neoplasm, patient inform concerning importance of follow-up.     It is of paramount importance that patient follow-up with subspecialties concerning abnormal radiographic findings including the colon and the lung.        Benign-appearing abdominal examination without GI symptoms a lower clinical suspicion of colitis but will continue monitoring     If clinical suspicion of colitis, add anaerobic coverage.     Trend leukocytosis     Trend fever curve     Magnesium and potassium supplementation given.    Disposition/additional care plan/interventions: 4/6/2025       Hypokalemia resolved with supplementation    Secondary leukocytosis improved    Positive blood culture 1 of 2 possibly contaminant...................... infectious disease consult, await bacterial identification,Blood culture re-  surveillance    No overt bleeding reported: Melena, hematochezia, epistaxis or hematuria.  Overall continue to monitor H&H.    Monitor marginal blood pressure reported 93/60 at 5:03 AM this morning blood pressure 9:16 101/58 .......... intravenous IV fluids as needed, recent  heart rate within normal limit.    Patient appeared clinically improved today.    Chronic left ankle wound........... wound care nursing consulted.    Continue empirical antibiotics for pneumonia.    Avoid pharmacological DVT chemoprophylaxis in light of possible colitis related bleed............ abdominal examination remained benign on examination          The patient was informed of differential diagnosis , work up , plan of care and possible sequelae of clinical disposition.Patient in agreement with plan of care. Further recommendations forthcoming in accordance with patient's clinical disposition and response to care.     Discharge planning:Discharge timing to be determined.                    Dictation performed with assistance of voice recognition device therefore transcription errors are possible

## 2025-04-07 LAB
ANION GAP SERPL CALC-SCNC: 8 MMOL/L (ref 10–20)
ATRIAL RATE: 111 BPM
BUN SERPL-MCNC: 24 MG/DL (ref 6–23)
CALCIUM SERPL-MCNC: 7.4 MG/DL (ref 8.6–10.3)
CHLORIDE SERPL-SCNC: 105 MMOL/L (ref 98–107)
CO2 SERPL-SCNC: 25 MMOL/L (ref 21–32)
CREAT SERPL-MCNC: 1.38 MG/DL (ref 0.5–1.3)
EGFRCR SERPLBLD CKD-EPI 2021: 53 ML/MIN/1.73M*2
ERYTHROCYTE [DISTWIDTH] IN BLOOD BY AUTOMATED COUNT: 13.9 % (ref 11.5–14.5)
GLUCOSE BLD MANUAL STRIP-MCNC: 117 MG/DL (ref 74–99)
GLUCOSE BLD MANUAL STRIP-MCNC: 150 MG/DL (ref 74–99)
GLUCOSE BLD MANUAL STRIP-MCNC: 156 MG/DL (ref 74–99)
GLUCOSE BLD MANUAL STRIP-MCNC: 256 MG/DL (ref 74–99)
GLUCOSE SERPL-MCNC: 143 MG/DL (ref 74–99)
HCT VFR BLD AUTO: 30.5 % (ref 41–52)
HGB BLD-MCNC: 9.6 G/DL (ref 13.5–17.5)
MCH RBC QN AUTO: 28.6 PG (ref 26–34)
MCHC RBC AUTO-ENTMCNC: 31.5 G/DL (ref 32–36)
MCV RBC AUTO: 91 FL (ref 80–100)
NRBC BLD-RTO: 0 /100 WBCS (ref 0–0)
P AXIS: 59 DEGREES
PLATELET # BLD AUTO: 253 X10*3/UL (ref 150–450)
POTASSIUM SERPL-SCNC: 4.1 MMOL/L (ref 3.5–5.3)
PR INTERVAL: 100 MS
Q ONSET: 252 MS
QRS COUNT: 18 BEATS
QRS DURATION: 103 MS
QT INTERVAL: 347 MS
QTC CALCULATION(BAZETT): 470 MS
QTC FREDERICIA: 425 MS
R AXIS: 47 DEGREES
RBC # BLD AUTO: 3.36 X10*6/UL (ref 4.5–5.9)
SODIUM SERPL-SCNC: 134 MMOL/L (ref 136–145)
T AXIS: 226 DEGREES
T OFFSET: 426 MS
VENTRICULAR RATE: 110 BPM
WBC # BLD AUTO: 7.5 X10*3/UL (ref 4.4–11.3)

## 2025-04-07 PROCEDURE — 1200000002 HC GENERAL ROOM WITH TELEMETRY DAILY

## 2025-04-07 PROCEDURE — 36415 COLL VENOUS BLD VENIPUNCTURE: CPT | Performed by: HOSPITALIST

## 2025-04-07 PROCEDURE — 82947 ASSAY GLUCOSE BLOOD QUANT: CPT

## 2025-04-07 PROCEDURE — 2500000002 HC RX 250 W HCPCS SELF ADMINISTERED DRUGS (ALT 637 FOR MEDICARE OP, ALT 636 FOR OP/ED): Performed by: HOSPITALIST

## 2025-04-07 PROCEDURE — 2500000001 HC RX 250 WO HCPCS SELF ADMINISTERED DRUGS (ALT 637 FOR MEDICARE OP): Performed by: STUDENT IN AN ORGANIZED HEALTH CARE EDUCATION/TRAINING PROGRAM

## 2025-04-07 PROCEDURE — 2500000004 HC RX 250 GENERAL PHARMACY W/ HCPCS (ALT 636 FOR OP/ED): Performed by: STUDENT IN AN ORGANIZED HEALTH CARE EDUCATION/TRAINING PROGRAM

## 2025-04-07 PROCEDURE — 94667 MNPJ CHEST WALL 1ST: CPT

## 2025-04-07 PROCEDURE — 99233 SBSQ HOSP IP/OBS HIGH 50: CPT | Performed by: INTERNAL MEDICINE

## 2025-04-07 PROCEDURE — 2500000002 HC RX 250 W HCPCS SELF ADMINISTERED DRUGS (ALT 637 FOR MEDICARE OP, ALT 636 FOR OP/ED): Performed by: STUDENT IN AN ORGANIZED HEALTH CARE EDUCATION/TRAINING PROGRAM

## 2025-04-07 PROCEDURE — 2500000004 HC RX 250 GENERAL PHARMACY W/ HCPCS (ALT 636 FOR OP/ED): Performed by: INTERNAL MEDICINE

## 2025-04-07 PROCEDURE — 2500000004 HC RX 250 GENERAL PHARMACY W/ HCPCS (ALT 636 FOR OP/ED): Performed by: HOSPITALIST

## 2025-04-07 PROCEDURE — 80048 BASIC METABOLIC PNL TOTAL CA: CPT | Performed by: HOSPITALIST

## 2025-04-07 PROCEDURE — 2500000005 HC RX 250 GENERAL PHARMACY W/O HCPCS: Performed by: INTERNAL MEDICINE

## 2025-04-07 PROCEDURE — 85027 COMPLETE CBC AUTOMATED: CPT | Performed by: HOSPITALIST

## 2025-04-07 RX ORDER — LIDOCAINE 560 MG/1
1 PATCH PERCUTANEOUS; TOPICAL; TRANSDERMAL DAILY
Status: DISCONTINUED | OUTPATIENT
Start: 2025-04-07 | End: 2025-04-08 | Stop reason: HOSPADM

## 2025-04-07 RX ORDER — GUAIFENESIN 600 MG/1
1200 TABLET, EXTENDED RELEASE ORAL 2 TIMES DAILY
Status: DISCONTINUED | OUTPATIENT
Start: 2025-04-07 | End: 2025-04-08 | Stop reason: HOSPADM

## 2025-04-07 RX ADMIN — GUAIFENESIN 1200 MG: 600 TABLET ORAL at 20:40

## 2025-04-07 RX ADMIN — CEFTRIAXONE 1 G: 1 INJECTION, SOLUTION INTRAVENOUS at 06:15

## 2025-04-07 RX ADMIN — AZITHROMYCIN MONOHYDRATE 500 MG: 500 INJECTION, POWDER, LYOPHILIZED, FOR SOLUTION INTRAVENOUS at 14:23

## 2025-04-07 RX ADMIN — GUAIFENESIN 1200 MG: 600 TABLET ORAL at 11:48

## 2025-04-07 RX ADMIN — PANTOPRAZOLE SODIUM 40 MG: 40 TABLET, DELAYED RELEASE ORAL at 08:51

## 2025-04-07 RX ADMIN — INSULIN LISPRO 9 UNITS: 100 INJECTION, SOLUTION INTRAVENOUS; SUBCUTANEOUS at 11:48

## 2025-04-07 RX ADMIN — ENOXAPARIN SODIUM 40 MG: 40 INJECTION SUBCUTANEOUS at 08:51

## 2025-04-07 RX ADMIN — INSULIN GLARGINE 25 UNITS: 100 INJECTION, SOLUTION SUBCUTANEOUS at 21:18

## 2025-04-07 RX ADMIN — INSULIN LISPRO 3 UNITS: 100 INJECTION, SOLUTION INTRAVENOUS; SUBCUTANEOUS at 18:06

## 2025-04-07 RX ADMIN — LIDOCAINE 4% 1 PATCH: 40 PATCH TOPICAL at 11:48

## 2025-04-07 ASSESSMENT — COGNITIVE AND FUNCTIONAL STATUS - GENERAL
PERSONAL GROOMING: A LITTLE
DRESSING REGULAR LOWER BODY CLOTHING: A LITTLE
MOVING TO AND FROM BED TO CHAIR: A LOT
CLIMB 3 TO 5 STEPS WITH RAILING: A LOT
DRESSING REGULAR UPPER BODY CLOTHING: A LITTLE
TURNING FROM BACK TO SIDE WHILE IN FLAT BAD: A LITTLE
WALKING IN HOSPITAL ROOM: A LOT
DAILY ACTIVITIY SCORE: 19
TOILETING: A LITTLE
MOBILITY SCORE: 16
HELP NEEDED FOR BATHING: A LITTLE
STANDING UP FROM CHAIR USING ARMS: A LITTLE

## 2025-04-07 ASSESSMENT — PAIN - FUNCTIONAL ASSESSMENT
PAIN_FUNCTIONAL_ASSESSMENT: 0-10

## 2025-04-07 ASSESSMENT — PAIN SCALES - GENERAL
PAINLEVEL_OUTOF10: 0 - NO PAIN
PAINLEVEL_OUTOF10: 0 - NO PAIN
PAINLEVEL_OUTOF10: 2

## 2025-04-07 ASSESSMENT — ACTIVITIES OF DAILY LIVING (ADL): LACK_OF_TRANSPORTATION: NO

## 2025-04-07 NOTE — PROGRESS NOTES
[facility] INFECTIOUS DISEASE PROGRESS NOTE    Patient Name: Nancy Vogt  MRN: [id number]    INTERVAL HISTORY:   Patient seen and examined.  Denies any SOB, , fever/chills  Occasional cough  Tolerating Abx., Good appetite    Afebrile, VS stable, No dyspnea on 1L NC  No leukocytosis  Screat 1.38    ASSESSMENT:   LLL Pneumonia  - CT showing L lung basilar pneumonia and in inferior lingula.   Sepsis - resolving  Positive Bcx, one of 2, probable contaminant  DM2  Colitis w GI Bleed  RLL Nodule worrisome for neoplastic process    RECOMMENDATIONS:   Continue Rocephin and azithromycin  Repeat Bcx X2 ng x 1 day  Wean O2 as tolerated  urine strep pneumo and legionella.Negative  Monitor temp/counts/ respiratory status  Check procal in AM    Educated patient /family potential side effects of antibiotic.  Verbalized understanding.     MEDICATIONS: reviewed.    PHYSICAL EXAM:  Vital signs: /70 (BP Location: Left arm, Patient Position: Lying)   Pulse 74   Temp 36.7 °C (98 °F) (Temporal)   Resp 17   Ht 1.829 m (6')   Wt 73.3 kg (161 lb 9.6 oz)   SpO2 95%   BMI 21.92 kg/m²   Temp (24hrs), Av.8 °C (98.3 °F), Min:36.7 °C (98 °F), Max:37 °C (98.6 °F)    General: alert, oriented, NAD  Lungs: bilaterally clear to auscultation  Heart: regular rate and rhythm  Abdomen: soft, non tender, non distended, BS+  Extremities: no edema  No rashes  No joint inflammation  Neck supple  Lines ok  No CVAT    Labs:  rev     Microbiology data: reviewed    Imaging data: reviewed      Lemuel Perez CNP  [facility]   Date of service: 2025  Time of service: 4:20 PM      I personally saw and evaluated the patient. I reviewed the NP Hx, exam and MDM and I agree with the NP assessment and plan unless otherwise addended above.     Richard Alexander MD  [phone]  2025  7:47 PM

## 2025-04-07 NOTE — CARE PLAN
Problem: Skin  Goal: Decreased wound size/increased tissue granulation at next dressing change  Outcome: Progressing  Flowsheets (Taken 4/7/2025 1239)  Decreased wound size/increased tissue granulation at next dressing change:   Promote sleep for wound healing   Protective dressings over bony prominences  Goal: Participates in plan/prevention/treatment measures  Outcome: Progressing  Flowsheets (Taken 4/7/2025 1239)  Participates in plan/prevention/treatment measures:   Discuss with provider PT/OT consult   Elevate heels   Increase activity/out of bed for meals  Goal: Prevent/manage excess moisture  Outcome: Progressing  Flowsheets (Taken 4/7/2025 1239)  Prevent/manage excess moisture:   Cleanse incontinence/protect with barrier cream   Follow provider orders for dressing changes   Moisturize dry skin   Monitor for/manage infection if present  Goal: Prevent/minimize sheer/friction injuries  Outcome: Progressing  Flowsheets (Taken 4/7/2025 1239)  Prevent/minimize sheer/friction injuries:   Increase activity/out of bed for meals   Use pull sheet   Turn/reposition every 2 hours/use positioning/transfer devices  Goal: Promote/optimize nutrition  Outcome: Progressing  Flowsheets (Taken 4/7/2025 1239)  Promote/optimize nutrition:   Consume > 50% meals/supplements   Monitor/record intake including meals   Offer water/supplements/favorite foods  Goal: Promote skin healing  Outcome: Progressing  Flowsheets (Taken 4/7/2025 1239)  Promote skin healing:   Protective dressings over bony prominences   Turn/reposition every 2 hours/use positioning/transfer devices   Rotate device position/do not position patient on device     Problem: Diabetes  Goal: Achieve decreasing blood glucose levels by end of shift  Outcome: Progressing  Goal: Increase stability of blood glucose readings by end of shift  Outcome: Progressing  Goal: Decrease in ketones present in urine by end of shift  Outcome: Progressing  Goal: Maintain electrolyte  levels within acceptable range throughout shift  Outcome: Progressing  Goal: Maintain glucose levels >70mg/dl to <250mg/dl throughout shift  Outcome: Progressing  Goal: No changes in neurological exam by end of shift  Outcome: Progressing  Goal: Learn about and adhere to nutrition recommendations by end of shift  Outcome: Progressing  Goal: Vital signs within normal range for age by end of shift  Outcome: Progressing  Goal: Increase self care and/or family involovement by end of shift  Outcome: Progressing  Goal: Receive DSME education by end of shift  Outcome: Progressing     Problem: Pain - Adult  Goal: Verbalizes/displays adequate comfort level or baseline comfort level  Outcome: Progressing     Problem: Safety - Adult  Goal: Free from fall injury  Outcome: Progressing     Problem: Discharge Planning  Goal: Discharge to home or other facility with appropriate resources  Outcome: Progressing     Problem: Chronic Conditions and Co-morbidities  Goal: Patient's chronic conditions and co-morbidity symptoms are monitored and maintained or improved  Outcome: Progressing     Problem: Nutrition  Goal: Nutrient intake appropriate for maintaining nutritional needs  Outcome: Progressing     Problem: Fall/Injury  Goal: Not fall by end of shift  Outcome: Progressing  Goal: Be free from injury by end of the shift  Outcome: Progressing  Goal: Verbalize understanding of personal risk factors for fall in the hospital  Outcome: Progressing  Goal: Verbalize understanding of risk factor reduction measures to prevent injury from fall in the home  Outcome: Progressing  Goal: Use assistive devices by end of the shift  Outcome: Progressing  Goal: Pace activities to prevent fatigue by end of the shift  Outcome: Progressing   The patient's goals for the shift include      The clinical goals for the shift include Pt spo2 will remain 92% or above on supplemental o2 overnight

## 2025-04-07 NOTE — PROGRESS NOTES
04/07/25 1235   Discharge Planning   Living Arrangements Family members  (son and grandson)   Support Systems Family members   Assistance Needed uses wheelchair and walker and has handles on toilet   Type of Residence Private residence   Number of Stairs to Enter Residence 0   Number of Stairs Within Residence 0   Do you have animals or pets at home? Yes   Type of Animals or Pets dog   Who is requesting discharge planning? Provider   Home or Post Acute Services None   Expected Discharge Disposition Home   Does the patient need discharge transport arranged? No   Financial Resource Strain   How hard is it for you to pay for the very basics like food, housing, medical care, and heating? Not hard   Housing Stability   In the last 12 months, was there a time when you were not able to pay the mortgage or rent on time? N   In the past 12 months, how many times have you moved where you were living? 0   At any time in the past 12 months, were you homeless or living in a shelter (including now)? N   Transportation Needs   In the past 12 months, has lack of transportation kept you from medical appointments or from getting medications? no   In the past 12 months, has lack of transportation kept you from meetings, work, or from getting things needed for daily living? No   Stroke Family Assessment   Stroke Family Assessment Needed No   Intensity of Service   Intensity of Service 0-30 min     Spoke with pt explained TCC role in Care Transitions and verified address, phone number and emergency contact information. Sees wound doc weekly Dr CATARINA Pierre and preferred pharmacy [facility]  , denies issues obtaining or affording medications and takes as ordered. Pt is independent with his wheelchair and walker. He is diabetic and checks his blood sugar daily. He lives at home with his son and grandson and feels safe. He declines HHC. He goes to the Wound clinic in [place] 1x/ week  for wound care  and to [facility] for his prosthetic. He plans to return home as before with no new needs. [facility] 17/15 per nursing. ADOD is 24 hrs. Care Transitions to follow. Radha De La Paz BSN/RN-TCC

## 2025-04-07 NOTE — CONSULTS
Nutrition Initial Assessment:   Nutrition Assessment    Reason for Assessment: Admission nursing screening    Medical history per chart:   75 y.o. 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     4/7:  Pt reports good PO intake, and appetite.  Pt with wounds, but declined supplements at this time.  Reviewed high protein diet, and pt reports eating protein sources at all meals.      Nutrition History:  Energy Intake: Good > 75 %  Food and Nutrient History: Patient reports good PO intake prior to admit  Vitamin/Herbal Supplement Use: Declined supplements       Average meal Intake during admission: %   Dietary Orders (From admission, onward)       Start     Ordered    04/05/25 0653  May Participate in Room Service  ( ROOM SERVICE MAY PARTICIPATE)  Once        Question:  .  Answer:  Yes    04/05/25 0652    04/05/25 0619  Adult diet Consistent Carb; CCD 60 gm/meal  Diet effective now        Question Answer Comment   Diet type Consistent Carb    Carb diet selection: CCD 60 gm/meal        04/05/25 0618                    Anthropometrics:  Height: 182.9 cm (6')   Weight: 73.3 kg (161 lb 9.6 oz)   BMI (Calculated): 21.91  IBW/kg (Dietitian Calculated): 80.9 kg     Adjusted Body Weight (kg): 76.2 kg (R BKA)  Weight History:   Wt Readings from Last 10 Encounters:   04/05/25 73.3 kg (161 lb 9.6 oz)   10/09/23 68.9 kg (152 lb)   06/22/23 67.1 kg (148 lb)   02/21/23 68 kg (149 lb 14.6 oz)       Weight Change %:  Weight History / % Weight Change: Patient reports UBW of 161#.  No recent weights.  Significant Weight Loss: No    Nutrition Focused Physical Exam Findings:  Subcutaneous Fat Loss:   Orbital Fat Pads: Well nourished (slightly bulging fat pads)  Buccal Fat Pads: Well nourished (full, rounded cheeks)  Triceps: Well nourished (ample fat tissue)  Muscle Wasting:  Temporalis: Well nourished (well-defined muscle)  Pectoralis  (Clavicular Region): Well nourished (clavicle not visible)  Deltoid/Trapezius: Well nourished (rounded appearance at arm, shoulder, neck)  Interosseous: Well nourished (muscle bulges)  Edema:     Physical Findings:  Skin: Positive (multiple areas)    Nutrition Significant Labs:    Results from last 7 days   Lab Units 04/07/25  0530 04/06/25  0545 04/05/25  0151   GLUCOSE mg/dL 143* 103* 90   SODIUM mmol/L 134* 136 139   POTASSIUM mmol/L 4.1 4.1 3.4*   CHLORIDE mmol/L 105 101 104   CO2 mmol/L 25 25 26   BUN mg/dL 24* 25* 20   CREATININE mg/dL 1.38* 1.43* 1.35*   EGFR mL/min/1.73m*2 53* 51* 55*   CALCIUM mg/dL 7.4* 7.5* 8.6   MAGNESIUM mg/dL  --  1.77 1.41*     Lab Results   Component Value Date    HGBA1C 9.5 (H) 04/05/2025    HGBA1C 9.9 (H) 05/01/2024     Results from last 7 days   Lab Units 04/07/25  1109 04/07/25  0638 04/06/25  2115 04/06/25  1633 04/06/25  1104 04/06/25  0633 04/05/25  2043 04/05/25  1551   POCT GLUCOSE mg/dL 256* 150* 167* 152* 66* 93 173* 97       Nutrition Specific Medications:  Meds reviewed/noted.   cefTRIAXone, 1 g, intravenous, q24h  enoxaparin, 40 mg, subcutaneous, q24h  guaiFENesin, 1,200 mg, oral, BID  insulin glargine, 25 Units, subcutaneous, Nightly  insulin lispro, 0-15 Units, subcutaneous, TID AC  lidocaine, 1 patch, transdermal, Daily  pantoprazole, 40 mg, oral, Daily   Or  pantoprazole, 40 mg, intravenous, Daily             I/O:   Last BM Date: 04/05/25; Stool Appearance: Loose, Soft, Watery (04/05/25 1322)    Estimated Needs:      Method for Estimating Needs: 8693-9459 kcals (30-35 kcal/kg)  Total Protein Estimated Needs in 24 Hours (g): 110 g  Method for Estimating 24 Hour Protein Needs: 1.5 gm/kg     Method for Estimating 24 Hour Fluid Needs: 1ml/kcal        Nutrition Diagnosis   Malnutrition Diagnosis  Patient has Malnutrition Diagnosis: No    Nutrition Diagnosis  Patient has Nutrition Diagnosis: Yes  Diagnosis Status (1): New  Nutrition Diagnosis 1: Increased nutrient needs  (protein)  Related to (1): woundh healing  As Evidenced by (1): multiple wounds       Nutrition Interventions/Recommendations   Nutrition prescription for oral nutrition    Nutrition Recommendations:  Individualized Nutrition Prescription Provided for : High protein meals    Nutrition Interventions/Goals:   Interventions: No interventions at this time  Coordination of Care with Providers: Nursing  Goal: RN  Additional Interventions: Pt declined supplements      Education Documentation  Nutrition Related Education, taught by Leticia Moore RD at 4/7/2025  3:35 PM.  Learner: Patient  Readiness: Acceptance  Method: Explanation  Response: Verbalizes Understanding  Comment: Reviewed supplements, and high protein diet              Nutrition Monitoring and Evaluation   Food/Nutrient Related History Monitoring  Monitoring and Evaluation Plan: Estimated Energy Intake  Estimated Energy Intake: Energy intake greater or equal to 75% of estimated energy needs    Anthropometric Measurements  Monitoring and Evaluation Plan: Body weight  Body Weight: Body weight - Maintain stable weight    Biochemical Data, Medical Tests and Procedures  Monitoring and Evaluation Plan: Electrolyte/renal panel, Glucose/endocrine profile  Electrolyte and Renal Panel: Electrolytes within normal limits  Glucose/Endocrine Profile: Glucose within normal limits ( mg/dL)    Physical Exam Findings  Monitoring and Evaluation Plan: Skin  Skin Finding: Impaired wound healing - Improved wound healing  Other: Promote healing         Time Spent (min): 40 minutes

## 2025-04-07 NOTE — PROGRESS NOTES
Nancy Vogt is a 75 y.o. male on day 2 of admission presenting with Pneumonia due to organism.      Subjective   Doing well.  Having significant left-sided pain with coughing.  Has not really been able to bring any phlegm up but finally bringing some up.  Talked about doing scheduled breathing treatment along with incentive spirometry and flutter and adding lidocaine patch and patient agreeable       Objective     Last Recorded Vitals  /65 (BP Location: Left arm, Patient Position: Lying)   Pulse 71   Temp 36.7 °C (98.1 °F) (Temporal)   Resp 16   Wt 73.3 kg (161 lb 9.6 oz)   SpO2 95%   Intake/Output last 3 Shifts:    Intake/Output Summary (Last 24 hours) at 4/7/2025 1110  Last data filed at 4/7/2025 0845  Gross per 24 hour   Intake 200 ml   Output 1850 ml   Net -1650 ml       Admission Weight  Weight: 73.3 kg (161 lb 9.6 oz) (04/05/25 0051)    Daily Weight  04/05/25 : 73.3 kg (161 lb 9.6 oz)      Physical Exam:  General: Alert in mild distress on 1 L nasal cannula  HEENT: PERRLA, head intact and normocephalic  Neck: Normal to inspection  Lungs: Lungs are diminished on left side  Cardiac: Regular rate and rhythm  Abdomen: Soft nontender, positive bowel sounds  : Exam deferred  Skin: Intact  Hematology: No petechia or excessive ecchymosis  Musculoskeletal: Without significant trauma  Neurological: Alert awake oriented, no focal deficit, cranial nerves grossly intact  Psych: No suicidal ideation or homicidal ideation    Relevant Results  Scheduled medications  azithromycin, 500 mg, intravenous, q24h  cefTRIAXone, 1 g, intravenous, q24h  enoxaparin, 40 mg, subcutaneous, q24h  guaiFENesin, 1,200 mg, oral, BID  insulin glargine, 25 Units, subcutaneous, Nightly  insulin lispro, 0-15 Units, subcutaneous, TID AC  lidocaine, 1 patch, transdermal, Daily  pantoprazole, 40 mg, oral, Daily   Or  pantoprazole, 40 mg, intravenous, Daily      Continuous medications     PRN medications  PRN medications: bisacodyl,  bisacodyl, dextrose, dextrose, glucagon, glucagon, melatonin, ondansetron **OR** ondansetron   Labs  Results from last 7 days   Lab Units 04/07/25  0530 04/06/25  1649 04/06/25  0545 04/05/25  0151   WBC AUTO x10*3/uL 7.5  --  14.8* 17.8*   HEMOGLOBIN g/dL 9.6* 9.4* 8.4* 11.4*   HEMATOCRIT % 30.5* 30.2* 26.9* 36.6*   PLATELETS AUTO x10*3/uL 253  --  261 393     Results from last 7 days   Lab Units 04/07/25  0530 04/06/25  0545 04/05/25  0151   SODIUM mmol/L 134* 136 139   POTASSIUM mmol/L 4.1 4.1 3.4*   CHLORIDE mmol/L 105 101 104   CO2 mmol/L 25 25 26   BUN mg/dL 24* 25* 20   CREATININE mg/dL 1.38* 1.43* 1.35*   CALCIUM mg/dL 7.4* 7.5* 8.6   PROTEIN TOTAL g/dL  --   --  7.5   BILIRUBIN TOTAL mg/dL  --   --  0.5   ALK PHOS U/L  --   --  84   ALT U/L  --   --  9*   AST U/L  --   --  19   GLUCOSE mg/dL 143* 103* 90       Imaging  CT chest wo IV contrast    Result Date: 4/5/2025  Patchy consolidative opacities in the right upper lobe, left lower lobe, and to a lesser degree the right lower lobe. These are nonspecific but may relate to pneumonia. After treatment, follow-up recommended to document resolution. 10 mm right lower lobe nodule which appears to have increased in size when compared to a previous CT abdomen pelvis of 02/03/2011 when it measured approximately 5-6 mm. PET scan may be useful to assess for hypermetabolic activity as slow growing neoplasm is not excluded.   MACRO: None.   Signed by: Hector Saavedra 4/5/2025 7:23 AM Dictation workstation:   [id number]    CT angio abdomen pelvis w and or wo IV IV contrast    Result Date: 4/5/2025  There is submucosal edema and mural thickening in the colon, most pronounced involving the sigmoid colon, compatible with colitis. Underlying neoplasm is not excluded and correlation with endoscopy is recommended following resolution of patient's acute illness seen without recently performed to exclude colorectal carcinoma. Colonic diverticula are present without evidence of  acute diverticulitis. No abnormal enhancement is seen within the GI tract to suggest an active site of bleeding at this time.   Consolidation posteriorly in the left lung base, compatible with pneumonia. Patchy consolidative opacity in the inferior lingula also noted, which may relate to atelectasis or pneumonia.   Enlarged size and more solid appearance of rounded nodule in the right lower lobe measuring up to 9 mm in diameter, worrisome for neoplasia (series 11, images ). Recommend further evaluation with completion chest CT on a nonemergent basis. A yellow alert was sent.   Severe atherosclerosis, with moderate to severe narrowing of the origins of the celiac axis and SMA and right renal artery, severe narrowing of the origin of the left renal artery and proximal SHI, moderate to severe diffuse narrowing of bilateral common, external and iliac arteries and distal branches. No aneurysmal dilation is seen.   Nonobstructing left renal calculus incidentally noted.   Large left diaphragmatic hernia again noted with intrathoracic location of stomach, spleen and splenic flexure of the colon, multiple small bowel loops and tail of the pancreas.   Additional findings as discussed above   MACRO: Critical Finding:  See findings. Notification was initiated on 4/5/2025 at 4:11 am by  Valente Cleaning.  (**-YCF-**) Instructions:   Signed by: Valente Cleaning 4/5/2025 4:13 AM Dictation workstation:   [id number]    XR chest 1 view    Result Date: 4/5/2025  1.  New diffuse interstitial opacities may relate to chronic changes or acute pulmonary interstitial edema/CHF. There is consolidative opacity in the right lung apex, suspicious for pneumonia. Underlying malignancy is not excluded. Recommend further evaluation with chest CT. Mild left basilar atelectasis. No sizable pleural effusion. No pneumothorax. Similar asymmetric elevation of the left hemidiaphragm.       MACRO: None   Signed by: Valente Cleaning 4/5/2025 3:36 AM Dictation  workstation:   [id number]     Cardiology, Vascular, and Other Imaging  Electrocardiogram, 12-lead PRN ACS symptoms    Result Date: 4/7/2025  Sinus tachycardia Probable inferior infarct, old Lateral leads are also involved                     Assessment/Plan   Nancy Vogt is a 75 y.o. male with past medical history of insulin-dependent diabetes mellitus,, peripheral neuropathy, history of right BKA secondary to diabetic wound, nicotine dependence, PVD presented for left-sided pneumonia and acute hypoxemic respiratory failure  Assessment & Plan  Pneumonia due to organism    Type 2 diabetes mellitus    Sepsis (Multi)    Cigarette nicotine dependence without complication    Colitis    Assessment:  Left lower lobe pneumonia with sepsis present on admission  Insulin-dependent diabetes mellitus  Acute hypoxemic respiratory failure  Colitis versus possible neoplasm-outpatient colonoscopy recommended  Right lower lobe nodule  Right BKA  Nicotine dependence  Peripheral neuropathy    Plan:  Medically improving and on 1 L nasal cannula  Will need ambulatory pulse ox study prior to discharge tomorrow  Will order PT OT evaluation with a history of BKA  Continue with long-acting insulin and sliding scale insulin  Continue ceftriaxone and azithromycin  Scheduled Mucinex  Incentive spirometry, flutter valve  Lidocaine patch to the left chest wall  Out of bed as possible  Outpatient close follow-up with GI for possible colonoscopy  DVT prophylaxis with Lovenox       Plan discussed with patient at bedside  Disposition: Possible discharge tomorrow if clinically improving  High level of MDM based on above issue and discussing plan    This note is created using voice recognition software. All efforts are made to minimize errors, if there are errors there due to transcription.    Durga Mendoza  Hospitalist

## 2025-04-08 ENCOUNTER — PHARMACY VISIT (OUTPATIENT)
Dept: PHARMACY | Facility: CLINIC | Age: 75
End: 2025-04-08
Payer: COMMERCIAL

## 2025-04-08 VITALS
TEMPERATURE: 98 F | HEART RATE: 83 BPM | DIASTOLIC BLOOD PRESSURE: 73 MMHG | WEIGHT: 161.6 LBS | RESPIRATION RATE: 18 BRPM | HEIGHT: 72 IN | BODY MASS INDEX: 21.89 KG/M2 | OXYGEN SATURATION: 97 % | SYSTOLIC BLOOD PRESSURE: 109 MMHG

## 2025-04-08 LAB
ANION GAP SERPL CALC-SCNC: 13 MMOL/L (ref 10–20)
BACTERIA BLD AEROBE CULT: ABNORMAL
BACTERIA BLD AEROBE CULT: ABNORMAL
BACTERIA BLD CULT: ABNORMAL
BACTERIA BLD CULT: ABNORMAL
BASOPHILS # BLD AUTO: 0.05 X10*3/UL (ref 0–0.1)
BASOPHILS NFR BLD AUTO: 0.6 %
BUN SERPL-MCNC: 20 MG/DL (ref 6–23)
CALCIUM SERPL-MCNC: 7.7 MG/DL (ref 8.6–10.3)
CHLORIDE SERPL-SCNC: 102 MMOL/L (ref 98–107)
CO2 SERPL-SCNC: 26 MMOL/L (ref 21–32)
CREAT SERPL-MCNC: 1.36 MG/DL (ref 0.5–1.3)
EGFRCR SERPLBLD CKD-EPI 2021: 54 ML/MIN/1.73M*2
EOSINOPHIL # BLD AUTO: 0.25 X10*3/UL (ref 0–0.4)
EOSINOPHIL NFR BLD AUTO: 3 %
ERYTHROCYTE [DISTWIDTH] IN BLOOD BY AUTOMATED COUNT: 13.8 % (ref 11.5–14.5)
GLUCOSE BLD MANUAL STRIP-MCNC: 130 MG/DL (ref 74–99)
GLUCOSE BLD MANUAL STRIP-MCNC: 74 MG/DL (ref 74–99)
GLUCOSE SERPL-MCNC: 61 MG/DL (ref 74–99)
GRAM STN SPEC: ABNORMAL
GRAM STN SPEC: ABNORMAL
HCT VFR BLD AUTO: 32.5 % (ref 41–52)
HGB BLD-MCNC: 10.3 G/DL (ref 13.5–17.5)
IMM GRANULOCYTES # BLD AUTO: 0.03 X10*3/UL (ref 0–0.5)
IMM GRANULOCYTES NFR BLD AUTO: 0.4 % (ref 0–0.9)
LYMPHOCYTES # BLD AUTO: 1.7 X10*3/UL (ref 0.8–3)
LYMPHOCYTES NFR BLD AUTO: 20.7 %
MAGNESIUM SERPL-MCNC: 1.87 MG/DL (ref 1.6–2.4)
MCH RBC QN AUTO: 28.8 PG (ref 26–34)
MCHC RBC AUTO-ENTMCNC: 31.7 G/DL (ref 32–36)
MCV RBC AUTO: 91 FL (ref 80–100)
MONOCYTES # BLD AUTO: 0.6 X10*3/UL (ref 0.05–0.8)
MONOCYTES NFR BLD AUTO: 7.3 %
NEUTROPHILS # BLD AUTO: 5.59 X10*3/UL (ref 1.6–5.5)
NEUTROPHILS NFR BLD AUTO: 68 %
NRBC BLD-RTO: 0 /100 WBCS (ref 0–0)
PLATELET # BLD AUTO: 304 X10*3/UL (ref 150–450)
POTASSIUM SERPL-SCNC: 4.3 MMOL/L (ref 3.5–5.3)
PROCALCITONIN SERPL-MCNC: 16.15 NG/ML
RBC # BLD AUTO: 3.58 X10*6/UL (ref 4.5–5.9)
SODIUM SERPL-SCNC: 137 MMOL/L (ref 136–145)
WBC # BLD AUTO: 8.2 X10*3/UL (ref 4.4–11.3)

## 2025-04-08 PROCEDURE — 84145 PROCALCITONIN (PCT): CPT | Mod: PORLAB | Performed by: NURSE PRACTITIONER

## 2025-04-08 PROCEDURE — 94761 N-INVAS EAR/PLS OXIMETRY MLT: CPT

## 2025-04-08 PROCEDURE — 36415 COLL VENOUS BLD VENIPUNCTURE: CPT | Performed by: NURSE PRACTITIONER

## 2025-04-08 PROCEDURE — 2500000005 HC RX 250 GENERAL PHARMACY W/O HCPCS: Performed by: INTERNAL MEDICINE

## 2025-04-08 PROCEDURE — 83735 ASSAY OF MAGNESIUM: CPT | Performed by: INTERNAL MEDICINE

## 2025-04-08 PROCEDURE — 85025 COMPLETE CBC W/AUTO DIFF WBC: CPT | Performed by: INTERNAL MEDICINE

## 2025-04-08 PROCEDURE — 97161 PT EVAL LOW COMPLEX 20 MIN: CPT | Mod: GP | Performed by: PHYSICAL THERAPIST

## 2025-04-08 PROCEDURE — RXMED WILLOW AMBULATORY MEDICATION CHARGE

## 2025-04-08 PROCEDURE — 80048 BASIC METABOLIC PNL TOTAL CA: CPT | Performed by: INTERNAL MEDICINE

## 2025-04-08 PROCEDURE — 99239 HOSP IP/OBS DSCHRG MGMT >30: CPT | Performed by: INTERNAL MEDICINE

## 2025-04-08 PROCEDURE — 97165 OT EVAL LOW COMPLEX 30 MIN: CPT | Mod: GO

## 2025-04-08 PROCEDURE — 82947 ASSAY GLUCOSE BLOOD QUANT: CPT

## 2025-04-08 PROCEDURE — 2500000004 HC RX 250 GENERAL PHARMACY W/ HCPCS (ALT 636 FOR OP/ED): Performed by: STUDENT IN AN ORGANIZED HEALTH CARE EDUCATION/TRAINING PROGRAM

## 2025-04-08 PROCEDURE — 2500000004 HC RX 250 GENERAL PHARMACY W/ HCPCS (ALT 636 FOR OP/ED): Performed by: HOSPITALIST

## 2025-04-08 PROCEDURE — 2500000004 HC RX 250 GENERAL PHARMACY W/ HCPCS (ALT 636 FOR OP/ED): Performed by: INTERNAL MEDICINE

## 2025-04-08 PROCEDURE — 2500000001 HC RX 250 WO HCPCS SELF ADMINISTERED DRUGS (ALT 637 FOR MEDICARE OP): Performed by: STUDENT IN AN ORGANIZED HEALTH CARE EDUCATION/TRAINING PROGRAM

## 2025-04-08 RX ORDER — AMOXICILLIN AND CLAVULANATE POTASSIUM 875; 125 MG/1; MG/1
1 TABLET, FILM COATED ORAL 2 TIMES DAILY
Qty: 14 TABLET | Refills: 0 | Status: SHIPPED | OUTPATIENT
Start: 2025-04-08 | End: 2025-04-15

## 2025-04-08 RX ORDER — GUAIFENESIN 600 MG/1
1200 TABLET, EXTENDED RELEASE ORAL 2 TIMES DAILY
Qty: 20 TABLET | Refills: 0 | Status: SHIPPED | OUTPATIENT
Start: 2025-04-08 | End: 2025-04-13

## 2025-04-08 RX ORDER — LIDOCAINE 560 MG/1
1 PATCH PERCUTANEOUS; TOPICAL; TRANSDERMAL DAILY
Qty: 10 PATCH | Refills: 0 | Status: SHIPPED | OUTPATIENT
Start: 2025-04-09 | End: 2025-04-19

## 2025-04-08 RX ADMIN — CEFTRIAXONE 1 G: 1 INJECTION, SOLUTION INTRAVENOUS at 05:49

## 2025-04-08 RX ADMIN — LIDOCAINE 4% 1 PATCH: 40 PATCH TOPICAL at 08:11

## 2025-04-08 RX ADMIN — ENOXAPARIN SODIUM 40 MG: 40 INJECTION SUBCUTANEOUS at 08:11

## 2025-04-08 RX ADMIN — GUAIFENESIN 1200 MG: 600 TABLET ORAL at 08:11

## 2025-04-08 RX ADMIN — PANTOPRAZOLE SODIUM 40 MG: 40 TABLET, DELAYED RELEASE ORAL at 08:11

## 2025-04-08 ASSESSMENT — COGNITIVE AND FUNCTIONAL STATUS - GENERAL
EATING MEALS: A LITTLE
DRESSING REGULAR UPPER BODY CLOTHING: A LITTLE
TOILETING: A LITTLE
MOVING TO AND FROM BED TO CHAIR: A LITTLE
TOILETING: A LOT
PERSONAL GROOMING: A LITTLE
WALKING IN HOSPITAL ROOM: A LITTLE
STANDING UP FROM CHAIR USING ARMS: A LOT
CLIMB 3 TO 5 STEPS WITH RAILING: A LOT
DAILY ACTIVITIY SCORE: 17
TURNING FROM BACK TO SIDE WHILE IN FLAT BAD: A LITTLE
HELP NEEDED FOR BATHING: A LOT
PERSONAL GROOMING: A LITTLE
CLIMB 3 TO 5 STEPS WITH RAILING: TOTAL
HELP NEEDED FOR BATHING: A LOT
DRESSING REGULAR LOWER BODY CLOTHING: A LOT
MOVING TO AND FROM BED TO CHAIR: A LOT
MOBILITY SCORE: 18
DRESSING REGULAR LOWER BODY CLOTHING: A LITTLE
WALKING IN HOSPITAL ROOM: A LOT
DRESSING REGULAR UPPER BODY CLOTHING: A LITTLE
STANDING UP FROM CHAIR USING ARMS: A LITTLE
MOBILITY SCORE: 15
DAILY ACTIVITIY SCORE: 16

## 2025-04-08 ASSESSMENT — PAIN SCALES - GENERAL
PAINLEVEL_OUTOF10: 0 - NO PAIN
PAINLEVEL_OUTOF10: 0 - NO PAIN

## 2025-04-08 ASSESSMENT — PAIN - FUNCTIONAL ASSESSMENT: PAIN_FUNCTIONAL_ASSESSMENT: 0-10

## 2025-04-08 ASSESSMENT — ACTIVITIES OF DAILY LIVING (ADL)
BATHING_ASSISTANCE: MAXIMAL
ADL_ASSISTANCE: INDEPENDENT

## 2025-04-08 NOTE — PROGRESS NOTES
04/08/25 1100   Discharge Planning   Expected Discharge Disposition Home     Discharge plan is remains the same and pt plans to return home with his son and grandson at discharge no new needs at this time. [facility] 19/18. ADOD is today. Care Transitions to follow. Radha De La Paz BSN/RN-TCC

## 2025-04-08 NOTE — HOSPITAL COURSE
Nancy Vogt is a 75 y.o. male with past medical history of insulin-dependent diabetes mellitus,, peripheral neuropathy, history of right BKA secondary to diabetic wound, nicotine dependence, PVD presented for left-sided pneumonia and acute hypoxemic respiratory failure.  Patient was started on on antibiotics and ID was consulted due to positive blood culture which turns out to be contamination.  His repeat cultures have been negative and he is feeling better.  He had ambulatory pulse ox study and did not qualify for oxygen.  He completed 3 days of azithromycin and was on ceftriaxone.  Will transition him to 7 days of Augmentin to complete the course he will need repeat two-view chest x-ray to make sure there is complete resolution of pneumonia.  Currently he is in stable condition for discharge with follow-up with primary care provider.    39 minutes spent in discharge timing

## 2025-04-08 NOTE — NURSING NOTE
Pt given discharge instructions and education, pt verbalizes understanding. Ivs removed, cath tip intact, pt tolerated well. Pt changed into own clothing, states he is leaving with all personal belongings now, denies further needs. Pt taken via wheelchair now to front entrance. Pt to be driven to home by adult son now.

## 2025-04-08 NOTE — PROGRESS NOTES
Physical Therapy    Physical Therapy Evaluation    Patient Name: Nancy Vogt  MRN: [id number]  Today's Date: 4/8/2025   Time Calculation  Start Time: 0943  Stop Time: 0957  Time Calculation (min): 14 min  2307/2307-A    Assessment/Plan   PT Assessment  PT Assessment Results: Decreased strength, Decreased endurance, Impaired balance, Decreased mobility  Rehab Prognosis: Good  Barriers to Discharge Home: No anticipated barriers  Evaluation/Treatment Tolerance: Patient tolerated treatment well  Medical Staff Made Aware: Yes  End of Session Communication: Bedside nurse  Assessment Comment: Patient requires only SBA and cues prn for mobility/safety. Anticipate good improvement over course of admit.  End of Session Patient Position: Up in chair, Alarm on  IP OR SWING BED PT PLAN  Inpatient or Swing Bed: Inpatient  PT Plan  Treatment/Interventions: Bed mobility, Transfer training, Gait training, Balance training, Strengthening, Endurance training, Therapeutic exercise, Therapeutic activity, Home exercise program  PT Plan: Ongoing PT  PT Frequency: 2 times per week  PT Discharge Recommendations: No PT needed after discharge  PT - OK to Discharge: Yes (when medically cleared)      General Visit Information:  General  Reason for Referral: Dx: PNA  Referred By: Kelly  Past Medical History Relevant to Rehab: DM, peripheral neuropathy/PVD s/p R BKA, tobacco use  Prior to Session Communication: Bedside nurse  Patient Position Received:  (seen up amb in room with RT for O2 eval)  General Comment: Patient seen in room 2307, amb with R LE prosthetic and FWW on room air with RT    Home Living:  Home Living  Type of Home:  (Lives with family in 1 level home, 1 small threshold step to enter. Patient uses W/C or amb with prosthetic, uses FWW sometimes, fairly independent with ADLs.)    Prior Level of Function:       Precautions:  Precautions  Precautions Comment: falls    Vital Signs:  Vital Signs  SpO2:  (remained between 94% and 97% with  activity on room air per RT report)  Objective     Pain:  Pain Assessment  0-10 (Numeric) Pain Score: 0 - No pain    Cognition:  Cognition  Overall Cognitive Status: Within Functional Limits    General Assessments:      Activity Tolerance  Endurance: Tolerates 10 - 20 min exercise with multiple rests     Strength  Strength Comments: MARK LE quads grossly 4+/5        Postural Control  Postural Control:  (Sitting balance fair+; standing balance fair with AD and prosthetic)          Functional Assessments:        Transfers  Transfer:  (Stand to sit with SBA x 1 but cues for safety and hand placement/AD use)  Ambulation/Gait Training  Ambulation/Gait Training Performed:  (Amb 100 feet with SBA x1 and FWW/prosthetic; cues for pacing, safety, AD use - occasional standing breaks due to RT testing O2 sats.)          Extremity/Trunk Assessments:                Outcome Measures:     [facility] Basic Mobility  Turning from your back to your side while in a flat bed without using bedrails: None  Moving from lying on your back to sitting on the side of a flat bed without using bedrails: None  Moving to and from bed to chair (including a wheelchair): A little  Standing up from a chair using your arms (e.g. wheelchair or bedside chair): A little  To walk in hospital room: A little  Climbing 3-5 steps with railing: Total  Basic Mobility - Total Score: 18                                                             Goals:  Encounter Problems       Encounter Problems (Active)       PT Problem       mobility        Start:  04/08/25    Expected End:  04/22/25       Patient will perform all mobility (transfers/amb) with SBA x1 and no cues for safety         strengthening        Start:  04/08/25    Expected End:  04/22/25       Patient will perform 20+ reps of AROM/RROM for MARK LE's to improve safety and functional independence              Pain - Adult            Education Documentation  Precautions, taught by Leatha Kirk, PT at 4/8/2025  10:41 AM.  Learner: Patient  Readiness: Acceptance  Method: Explanation  Response: Needs Reinforcement    Mobility Training, taught by Leatha Kirk, PT at 4/8/2025 10:41 AM.  Learner: Patient  Readiness: Acceptance  Method: Explanation  Response: Needs Reinforcement    Education Comments  No comments found.

## 2025-04-08 NOTE — PROGRESS NOTES
Occupational Therapy  Evaluation    Patient Name: Nancy Vogt  MRN: [id number]  Today's Date: 4/8/2025  Time Calculation  Start Time: 0942  Stop Time: 0956  Time Calculation (min): 14 min    Current Problem:   1. Pneumonia due to organism    2. Sepsis without acute organ dysfunction, due to unspecified organism (Multi)        OT order: OT eval and treat   Referred by: Kelly  Reason for referral: ADLs, safety assessment  Past medical history related to rehab: DM, peripheral neuropathy/PVD s/p R BKA, tobacco use    Precautions:   Hearing/Visual Limitations: intact  Medical Precautions: Fall precautions  Prosthesis/Orthosis Used: Below knee prosthesis (RLE. at baseline. pt donned down prosthesis independently)    ASSESSMENT  OT Assessment: OT eval completed. The patient is functioning below baseline for ADLs and mobility. demonstrated decreased endurance secondary to pneumonia and hospitalization. can benefit from continued OT. Pt with Decreased ADL status, Decreased upper extremity strength, Decreased safe judgment during ADL, Decreased cognition, Decreased endurance, Decreased gross motor control, Decreased functional mobility, Decreased IADLs  Prognosis:    Barriers to discharge home: Physical needs           Tolerance:      PLAN  Frequency: 2 times per week  Treatment Interventions: ADL retraining, Functional transfer training, UE strengthening/ROM, Endurance training, Cognitive reorientation, Patient/family training, Equipment evaluation/education  Discharge Recommendations: Low intensity level of continued care  OT OK to discharge: Yes    GENERAL VISIT INFORMATION   Start of session communication: Bedside nurse  End of session communication: Bedside nurse  Family/caregiver present:    Caregiver feedback:    Co-Treatment: PT  Reason for co-treatment: to optimize safety and mobility, while focusing on discipline specific goals   Position Pt Received:  Up in room (ambulating with RT, who was performing home O2  test)  End of session position: Up in chair, Alarm on    SUBJECTIVE  Home Living:  Type of Home: House  Lives With: Adult children, Grandchildren  Home Adaptive Equipment: Walker rolling or standard, Wheelchair-manual  Home Layout: One level  Home Access:  (1 threshold entrance)  Home Living Comments: Lives with family in 1 level home, 1 small threshold step to enter. Patient uses W/C or amb with prosthetic, uses FWW sometimes, fairly independent with ADLs.     Prior Level of Function:  Receives Help From: Family  ADL Assistance: Independent  Homemaking Assistance: Needs assistance (able to perform most iadls but does not need to. pt reports his family takes care of iadls)  Ambulatory Assistance: Independent (walker or WC mod I)  Prior Function Comments: assist for community mobility    IADL History:       Pain:  Assessment: 0-10  Score: 0 - No pain  Type:    Location:    Interventions:    Response to pain interventions:      OBJECTIVE  Vital Signs:  Vitals Session: During OT  SpO2: 94 % (94-97% on room air during duration of tx session)    Cognition:  Overall Cognitive Status: Within Functional Limits  Orientation Level: Oriented X4             Current ADL function:   EATING:  Stand by     GROOMING: Stand by     BATHING: Maximal     UB DRESSING: Minimal     LB DRESSING: Stand by Don/doff L sock   TOILETING: Minimal    ADL comments:       Activity Tolerance:  Endurance: Endurance does not limit participation in activity    Bed Mobility/Transfers:   Bed Mobility  Bed Mobility: No  Transfers  Transfer:  (sit<>stand SBAX1)    Ambulation/Gait Training:  Functional Mobility  Functional Mobility Performed:  (pt performed functional mobility in room and hallway with SBAX1 FWW. mild fatigue.)    Sitting Balance:  Static Sitting Balance  Static Sitting-Level of Assistance: Distant supervision  Dynamic Sitting Balance  Dynamic Sitting-Level of Assistance: Distant supervision    Standing Balance:  Static Standing  Balance  Static Standing-Level of Assistance: Close supervision  Dynamic Standing Balance  Dynamic Standing-Level of Assistance: Close supervision, Contact guard    Vision: Vision - Basic Assessment  Current Vision: No visual deficits   and Vision - Complex Assessment  Ocular Range of Motion: Within Functional Limits    Sensation:  Light Touch: No apparent deficits    Strength:  Strength Comments: BUE grossly 4-/5    Perception:  Inattention/Neglect: Appears intact    Coordination:  Movements are Fluid and Coordinated: Yes     Hand Function:  Hand Function  Gross Grasp: Functional    Extremities: RUE   RUE : Within Functional Limits and LUE   LUE: Within Functional Limits    Outcome Measures: [facility] Daily Activity   Putting on and taking off regular lower body clothing: A little  Bathing (including washing, rinsing, drying): A lot  Putting on and taking off regular upper body clothing: A little  Toileting, which includes using toilet, bedpan or urinal: A little  Taking care of personal grooming such as brushing teeth: A little   Eating Meals: A little   Daily Activity - Total Score: 17    EDUCATION:     Education Documentation  ADL Training, taught by Shanita Alvarez OT at 4/8/2025 11:53 AM.  Learner: Patient  Readiness: Acceptance  Method: Explanation  Response: Needs Reinforcement    Education Comments  No comments found.        Goals:   Encounter Problems       Encounter Problems (Active)       ADLs       Patient will tolerate 25 minutes of ADL/activity without adverse symptoms or perceived exertion of less than 2/10.  (Progressing)       Start:  04/08/25    Expected End:  04/22/25

## 2025-04-08 NOTE — PROGRESS NOTES
[facility] INFECTIOUS DISEASE PROGRESS NOTE    Patient Name: Nancy Vogt  MRN: [id number]    INTERVAL HISTORY:     Denies worsening SOB. No fevers/chills. Now on room air.,    ASSESSMENT:   LLL Pneumonia  - CT showing L lung basilar pneumonia and in inferior lingula.   - urine strep pneumo and legionella.Negative  - Procal 16.15    Sepsis- resolved  - Afebrile. No leukocytosis  Positive Bcx, one of 2, probable contaminant  DM2  Colitis w GI Bleed  RLL Nodule worrisome for neoplastic process    RECOMMENDATIONS:   Continue Rocephin   - agree to Augmentin x 10 days  Follow up with PCP.  Repeat Bcx X2 ng x 2 days          MEDICATIONS: reviewed.    PHYSICAL EXAM:  Vital signs: /73 (BP Location: Left arm, Patient Position: Lying)   Pulse 83   Temp 36.7 °C (98 °F) (Temporal)   Resp 18   Ht 1.829 m (6')   Wt 73.3 kg (161 lb 9.6 oz)   SpO2 97%   BMI 21.92 kg/m²   Temp (24hrs), Av.6 °C (97.9 °F), Min:36.2 °C (97.2 °F), Max:36.9 °C (98.4 °F)    General: alert, oriented, NAD  Lungs: bilaterally clear to auscultation  Heart: regular rate and rhythm  Abdomen: soft, non tender, non distended, BS+  Extremities: no edema  No rashes  No joint inflammation  Neck supple  Lines ok  No CVAT    Labs:  rev     Microbiology data: reviewed    Imaging data: reviewed      Lemuel Perez [facility] Infectious Disease   Date of service: 2025  Time of service: 3:41 PM    I personally saw and evaluated the patient. I reviewed the NP Hx, exam and MDM and I agree with the NP assessment and plan unless otherwise addended above.     Richard Alexander MD  [phone]

## 2025-04-08 NOTE — CARE PLAN
The patient's goals for the shift include      The clinical goals for the shift include patient will remain free of fall or injury this shift    Over the shift, the patient did not make progress toward the following goals. Barriers to progression include . Recommendations to address these barriers include   Problem: Skin  Goal: Decreased wound size/increased tissue granulation at next dressing change  Outcome: Progressing  Goal: Participates in plan/prevention/treatment measures  Outcome: Progressing  Goal: Prevent/manage excess moisture  Outcome: Progressing  Goal: Prevent/minimize sheer/friction injuries  Outcome: Progressing  Goal: Promote/optimize nutrition  Outcome: Progressing  Goal: Promote skin healing  Outcome: Progressing     Problem: Diabetes  Goal: Achieve decreasing blood glucose levels by end of shift  Outcome: Progressing  Goal: Increase stability of blood glucose readings by end of shift  Outcome: Progressing  Goal: Decrease in ketones present in urine by end of shift  Outcome: Progressing  Goal: Maintain electrolyte levels within acceptable range throughout shift  Outcome: Progressing  Goal: Maintain glucose levels >70mg/dl to <250mg/dl throughout shift  Outcome: Progressing  Goal: No changes in neurological exam by end of shift  Outcome: Progressing  Goal: Learn about and adhere to nutrition recommendations by end of shift  Outcome: Progressing  Goal: Vital signs within normal range for age by end of shift  Outcome: Progressing  Goal: Increase self care and/or family involovement by end of shift  Outcome: Progressing  Goal: Receive DSME education by end of shift  Outcome: Progressing     Problem: Pain - Adult  Goal: Verbalizes/displays adequate comfort level or baseline comfort level  Outcome: Progressing     Problem: Safety - Adult  Goal: Free from fall injury  Outcome: Progressing     Problem: Discharge Planning  Goal: Discharge to home or other facility with appropriate resources  Outcome:  Progressing     Problem: Chronic Conditions and Co-morbidities  Goal: Patient's chronic conditions and co-morbidity symptoms are monitored and maintained or improved  Outcome: Progressing     Problem: Nutrition  Goal: Nutrient intake appropriate for maintaining nutritional needs  Outcome: Progressing     Problem: Fall/Injury  Goal: Not fall by end of shift  Outcome: Progressing  Goal: Be free from injury by end of the shift  Outcome: Progressing  Goal: Verbalize understanding of personal risk factors for fall in the hospital  Outcome: Progressing  Goal: Verbalize understanding of risk factor reduction measures to prevent injury from fall in the home  Outcome: Progressing  Goal: Use assistive devices by end of the shift  Outcome: Progressing  Goal: Pace activities to prevent fatigue by end of the shift  Outcome: Progressing   .

## 2025-04-08 NOTE — CARE PLAN
Home O2 Evaluation:    SpO2 on room air at rest:     96%    SpO2 on room air with exertion:    94 %    SpO2 on oxygen at rest:     NA%    SpO2 on oxygen with exertion:   NA  %    SpO2 on 4L/min O2 with exertion:  NA  %    Ambulation distance:    100  ft    Recommended O2 device: NONE     Recommended O2 L/min:NONE    Recommended FiO2 %: 21%    (Zehra Henry at [facility]ilit[facility] going O2.)   No

## 2025-04-08 NOTE — DISCHARGE SUMMARY
Discharge Diagnosis  Pneumonia due to organism    Issues Requiring Follow-Up  Follow-up with primary care provider  We strongly recommend smoking cessation  He will need repeat chest x-ray in 4 to 6 weeks    Discharge Meds     Medication List      START taking these medications     amoxicillin-pot clavulanate 875-125 mg tablet; Commonly known as:   Augmentin; Take 1 tablet by mouth 2 times a day for 7 days.   guaiFENesin 600 mg 12 hr tablet; Commonly known as: Mucinex; Take 2   tablets (1,200 mg) by mouth 2 times a day for 5 days. Do not crush, chew,   or split.   lidocaine 4 % patch; Place 1 patch over 12 hours on the skin once daily   for 10 days. Remove & discard patch within 12 hours or as directed by MD.;   Start taking on: April 9, 2025     CONTINUE taking these medications     aspirin 81 mg EC tablet   Lantus Solostar U-100 Insulin 100 unit/mL (3 mL) pen; Generic drug:   insulin glargine; INJECT 25 UNITS UNDER THE SKIN AT BEDTIME   metFORMIN 1,000 mg tablet; Commonly known as: Glucophage     STOP taking these medications     insulin asp prt-insulin aspart 100 unit/mL (70-30) pen; Commonly known   as: NovoLOG Mix 70-30       Test Results Pending At Discharge  Pending Labs       Order Current Status    Procalcitonin In process    Blood Culture Preliminary result    Blood Culture Preliminary result    Blood Culture Preliminary result    Blood Culture Preliminary result            Hospital Course  Nancy Vogt is a 75 y.o. male with past medical history of insulin-dependent diabetes mellitus,, peripheral neuropathy, history of right BKA secondary to diabetic wound, nicotine dependence, PVD presented for left-sided pneumonia and acute hypoxemic respiratory failure.  Patient was started on on antibiotics and ID was consulted due to positive blood culture which turns out to be contamination.  His repeat cultures have been negative and he is feeling better.  He had ambulatory pulse ox study and did not qualify for  oxygen.  He completed 3 days of azithromycin and was on ceftriaxone.  Will transition him to 7 days of Augmentin to complete the course he will need repeat two-view chest x-ray to make sure there is complete resolution of pneumonia.  Currently he is in stable condition for discharge with follow-up with primary care provider.    39 minutes spent in discharge timing    Pertinent Physical Exam At Time of Discharge  General: Not in acute distress, alert  HEENT: PERRLA, head intact and normocephalic  Neck: Normal to inspection  Lungs: Clear to auscultation, work of breathing within normal limit  Cardiac: Regular rate and rhythm  Abdomen: Soft nontender, positive bowel sounds  : Exam deferred  Skin: Intact  Hematology: No petechia or excessive ecchymosis  Musculoskeletal: BKA noted  Neurological: Alert awake oriented, no focal deficit, cranial nerves grossly intact  Psych: No suicidal ideation or homicidal ideation    Outpatient Follow-Up  No future appointments.      Durga Mendoza MD

## 2025-04-09 ENCOUNTER — OFFICE VISIT (OUTPATIENT)
Dept: WOUND CARE | Facility: CLINIC | Age: 75
DRG: 166 | End: 2025-04-09
Payer: MEDICARE

## 2025-04-09 LAB — BACTERIA BLD CULT: NORMAL

## 2025-04-09 NOTE — DOCUMENTATION CLARIFICATION NOTE
"    PATIENT:               ROMI SEVILLA  ACCT #:                  [id number]  MRN:                       [id number]  :                       1950  ADMIT DATE:       2025 12:51 AM  DISCH DATE:        2025 4:04 PM  RESPONDING PROVIDER #:        [id number]          PROVIDER RESPONSE TEXT:    Sepsis was a differential diagnosis and ruled out after study    CDI QUERY TEXT:    Clarification    Instruction:  Based on your assessment of the patient and the clinical information, please provide the requested documentation by clicking on the appropriate radio button and enter any additional information if prompted.    Question: Sepsis was documented in the medical record. Based on the documentation and the clinical information, can the diagnosis be further clarified as    When answering this query, please exercise your independent professional judgment. The fact that a question is being asked, does not imply that any particular answer is desired or expected.    The patient's clinical indicators include:  Clinical Information: Patient admitted with pneumonia with noted sepsis    Documented Diagnosis: Per H and P, \"LLL pneumonia, sepsis.\"    Clinical Indicators:   -Vital Signs: T 97.2, P 105-111, R 13-20, BP 97//67, satting 89% on RA- placed on 3LNC satting 97%  -WBC: 17.8  -Microbiology Results: blood culture, \"Staphylococcus epidermidis- A single positive blood culture with this organism may indicate contamination at collection.\"  -Band Neutrophil Count/percent Band Neutrophil: 17.03  -Lactic acid: 1.6  -BUN/Creat: 20/1.35  -Bilirubin: 0.5  -MAP: 71-90  -[place] Coma Scale: 14-15  -PAO2/FIO2: NA  -Procalcitonin: NA  -Platelets: 393  -Other clinical indicators: Per H and P, \"LLL pneumonia, sepsis:  CT showing L lung basilar pneumonia and in inferior lingula.  Start Rocephin and azithromycin  Sepsis bolus ordered - pt is septic with tachycardia and WBC count 17.8.  Bcx X2 ordered. Lactate 1.6.  Wean O2 as " "tolerated  Check urine strep pneumo and legionella.\"    Treatment: 1200cc NS bolus, 1000cc NS bolus, IV zithromax- completed, IV rocephin- active, blood cultures, supplemental O2, monitoring labs/vitals    Risk Factors: 75yom admitted with pneumonia with noted sepsis  Options provided:  -- Sepsis was a differential diagnosis and ruled out after study  -- Sepsis with other organ dysfunction, Please specify sepsis associated organ dysfunction below  -- Bacteremia without sepsis  -- Other - I will add my own diagnosis  -- Refer to Clinical Documentation Reviewer    Query created by: Mikayla Hummel on 4/8/2025 1:50 PM      Electronically signed by:  BRENDEN BERNSTEIN MD 4/9/2025 8:23 AM          "

## 2025-04-10 LAB
BACTERIA BLD CULT: NORMAL
BACTERIA BLD CULT: NORMAL

## 2025-04-23 ENCOUNTER — OFFICE VISIT (OUTPATIENT)
Dept: WOUND CARE | Facility: CLINIC | Age: 75
End: 2025-04-23
Payer: MEDICARE

## 2025-04-23 PROCEDURE — 11042 DBRDMT SUBQ TIS 1ST 20SQCM/<: CPT

## 2025-05-07 ENCOUNTER — OFFICE VISIT (OUTPATIENT)
Dept: WOUND CARE | Facility: CLINIC | Age: 75
End: 2025-05-07
Payer: MEDICARE

## 2025-05-07 PROCEDURE — 11042 DBRDMT SUBQ TIS 1ST 20SQCM/<: CPT

## 2025-05-21 ENCOUNTER — APPOINTMENT (OUTPATIENT)
Dept: WOUND CARE | Facility: CLINIC | Age: 75
End: 2025-05-21
Payer: MEDICARE

## 2025-05-28 ENCOUNTER — OFFICE VISIT (OUTPATIENT)
Dept: WOUND CARE | Facility: CLINIC | Age: 75
End: 2025-05-28
Payer: MEDICARE

## 2025-05-28 PROCEDURE — 11042 DBRDMT SUBQ TIS 1ST 20SQCM/<: CPT

## 2025-06-04 ENCOUNTER — OFFICE VISIT (OUTPATIENT)
Dept: WOUND CARE | Facility: CLINIC | Age: 75
End: 2025-06-04
Payer: MEDICARE

## 2025-06-04 DIAGNOSIS — L97.323 NON-PRESSURE CHRONIC ULCER OF LEFT ANKLE WITH NECROSIS OF MUSCLE (MULTI): Primary | ICD-10-CM

## 2025-06-04 PROCEDURE — 11042 DBRDMT SUBQ TIS 1ST 20SQCM/<: CPT

## 2025-06-11 ENCOUNTER — OFFICE VISIT (OUTPATIENT)
Dept: WOUND CARE | Facility: CLINIC | Age: 75
End: 2025-06-11
Payer: MEDICARE

## 2025-06-11 PROCEDURE — 11042 DBRDMT SUBQ TIS 1ST 20SQCM/<: CPT

## 2025-06-12 LAB
ALBUMIN SERPL-MCNC: 3.3 G/DL (ref 3.6–5.1)
ALP SERPL-CCNC: 94 U/L (ref 35–144)
ALT SERPL-CCNC: 4 U/L (ref 9–46)
ANION GAP SERPL CALCULATED.4IONS-SCNC: 7 MMOL/L (CALC) (ref 7–17)
AST SERPL-CCNC: 10 U/L (ref 10–35)
BILIRUB SERPL-MCNC: 0.3 MG/DL (ref 0.2–1.2)
BUN SERPL-MCNC: 23 MG/DL (ref 7–25)
CALCIUM SERPL-MCNC: 8.4 MG/DL (ref 8.6–10.3)
CHLORIDE SERPL-SCNC: 104 MMOL/L (ref 98–110)
CO2 SERPL-SCNC: 25 MMOL/L (ref 20–32)
CREAT SERPL-MCNC: 1.34 MG/DL (ref 0.7–1.28)
EGFRCR SERPLBLD CKD-EPI 2021: 55 ML/MIN/1.73M2
ERYTHROCYTE [DISTWIDTH] IN BLOOD BY AUTOMATED COUNT: 12.7 % (ref 11–15)
GLUCOSE SERPL-MCNC: 159 MG/DL (ref 65–99)
HCT VFR BLD AUTO: 33.4 % (ref 38.5–50)
HGB BLD-MCNC: 10.3 G/DL (ref 13.2–17.1)
MCH RBC QN AUTO: 28.1 PG (ref 27–33)
MCHC RBC AUTO-ENTMCNC: 30.8 G/DL (ref 32–36)
MCV RBC AUTO: 91 FL (ref 80–100)
PLATELET # BLD AUTO: 493 THOUSAND/UL (ref 140–400)
PMV BLD REES-ECKER: 8.9 FL (ref 7.5–12.5)
POTASSIUM SERPL-SCNC: 4.8 MMOL/L (ref 3.5–5.3)
PROT SERPL-MCNC: 7 G/DL (ref 6.1–8.1)
RBC # BLD AUTO: 3.67 MILLION/UL (ref 4.2–5.8)
SODIUM SERPL-SCNC: 136 MMOL/L (ref 135–146)
WBC # BLD AUTO: 8.9 THOUSAND/UL (ref 3.8–10.8)

## 2025-06-18 ENCOUNTER — OFFICE VISIT (OUTPATIENT)
Dept: WOUND CARE | Facility: CLINIC | Age: 75
End: 2025-06-18
Payer: MEDICARE

## 2025-06-18 PROCEDURE — 11042 DBRDMT SUBQ TIS 1ST 20SQCM/<: CPT

## 2025-07-02 ENCOUNTER — OFFICE VISIT (OUTPATIENT)
Dept: WOUND CARE | Facility: CLINIC | Age: 75
End: 2025-07-02
Payer: MEDICARE

## 2025-07-02 PROCEDURE — 11043 DBRDMT MUSC&/FSCA 1ST 20/<: CPT

## 2025-07-09 ENCOUNTER — OFFICE VISIT (OUTPATIENT)
Dept: WOUND CARE | Facility: CLINIC | Age: 75
End: 2025-07-09
Payer: MEDICARE

## 2025-07-09 PROCEDURE — 11042 DBRDMT SUBQ TIS 1ST 20SQCM/<: CPT

## 2025-07-16 ENCOUNTER — APPOINTMENT (OUTPATIENT)
Dept: WOUND CARE | Facility: CLINIC | Age: 75
End: 2025-07-16
Payer: MEDICARE

## 2025-07-23 ENCOUNTER — OFFICE VISIT (OUTPATIENT)
Dept: WOUND CARE | Facility: CLINIC | Age: 75
End: 2025-07-23
Payer: MEDICARE

## 2025-07-23 PROCEDURE — 11042 DBRDMT SUBQ TIS 1ST 20SQCM/<: CPT

## 2025-07-30 ENCOUNTER — OFFICE VISIT (OUTPATIENT)
Dept: WOUND CARE | Facility: CLINIC | Age: 75
End: 2025-07-30
Payer: MEDICARE

## 2025-07-30 DIAGNOSIS — L97.323 NON-PRESSURE CHRONIC ULCER OF LEFT ANKLE WITH NECROSIS OF MUSCLE (MULTI): Primary | ICD-10-CM

## 2025-07-30 PROCEDURE — 99213 OFFICE O/P EST LOW 20 MIN: CPT

## 2025-08-03 LAB
BACTERIA SPEC AEROBE CULT: ABNORMAL
BACTERIA SPEC ANAEROBE CULT: ABNORMAL

## 2025-08-05 LAB
BACTERIA SPEC AEROBE CULT: ABNORMAL
BACTERIA SPEC ANAEROBE CULT: ABNORMAL

## 2025-08-06 ENCOUNTER — OFFICE VISIT (OUTPATIENT)
Dept: WOUND CARE | Facility: CLINIC | Age: 75
End: 2025-08-06
Payer: MEDICARE

## 2025-08-06 PROCEDURE — 99213 OFFICE O/P EST LOW 20 MIN: CPT

## 2025-08-13 ENCOUNTER — OFFICE VISIT (OUTPATIENT)
Dept: WOUND CARE | Facility: CLINIC | Age: 75
End: 2025-08-13
Payer: MEDICARE

## 2025-08-13 PROCEDURE — 99213 OFFICE O/P EST LOW 20 MIN: CPT

## 2025-08-20 ENCOUNTER — OFFICE VISIT (OUTPATIENT)
Dept: WOUND CARE | Facility: CLINIC | Age: 75
End: 2025-08-20
Payer: MEDICARE

## 2025-08-20 PROCEDURE — 99213 OFFICE O/P EST LOW 20 MIN: CPT

## 2025-08-26 ENCOUNTER — APPOINTMENT (OUTPATIENT)
Dept: ENDOCRINOLOGY | Facility: CLINIC | Age: 75
End: 2025-08-26
Payer: MEDICARE

## 2025-09-03 ENCOUNTER — OFFICE VISIT (OUTPATIENT)
Dept: WOUND CARE | Facility: CLINIC | Age: 75
End: 2025-09-03
Payer: MEDICARE

## 2025-09-03 PROCEDURE — 11042 DBRDMT SUBQ TIS 1ST 20SQCM/<: CPT

## 2025-09-26 ENCOUNTER — APPOINTMENT (OUTPATIENT)
Dept: ENDOCRINOLOGY | Facility: CLINIC | Age: 75
End: 2025-09-26
Payer: MEDICARE